# Patient Record
Sex: FEMALE | Race: WHITE | NOT HISPANIC OR LATINO | URBAN - METROPOLITAN AREA
[De-identification: names, ages, dates, MRNs, and addresses within clinical notes are randomized per-mention and may not be internally consistent; named-entity substitution may affect disease eponyms.]

---

## 2018-02-07 ENCOUNTER — OUTPATIENT (OUTPATIENT)
Dept: OUTPATIENT SERVICES | Facility: HOSPITAL | Age: 83
LOS: 1 days | Discharge: HOME | End: 2018-02-07

## 2018-02-07 DIAGNOSIS — I10 ESSENTIAL (PRIMARY) HYPERTENSION: ICD-10-CM

## 2018-02-07 DIAGNOSIS — N18.4 CHRONIC KIDNEY DISEASE, STAGE 4 (SEVERE): ICD-10-CM

## 2018-06-29 ENCOUNTER — OUTPATIENT (OUTPATIENT)
Dept: OUTPATIENT SERVICES | Facility: HOSPITAL | Age: 83
LOS: 1 days | Discharge: HOME | End: 2018-06-29

## 2018-06-29 DIAGNOSIS — N18.4 CHRONIC KIDNEY DISEASE, STAGE 4 (SEVERE): ICD-10-CM

## 2018-11-12 ENCOUNTER — INPATIENT (INPATIENT)
Facility: HOSPITAL | Age: 83
LOS: 5 days | Discharge: HOME HEALTH | End: 2018-11-18
Attending: INTERNAL MEDICINE | Admitting: INTERNAL MEDICINE

## 2018-11-12 VITALS
TEMPERATURE: 98 F | SYSTOLIC BLOOD PRESSURE: 101 MMHG | RESPIRATION RATE: 16 BRPM | DIASTOLIC BLOOD PRESSURE: 57 MMHG | HEART RATE: 77 BPM | OXYGEN SATURATION: 99 %

## 2018-11-12 DIAGNOSIS — H57.8 OTHER SPECIFIED DISORDERS OF EYE AND ADNEXA: Chronic | ICD-10-CM

## 2018-11-12 LAB
ABO RH CONFIRMATION: SIGNIFICANT CHANGE UP
ALBUMIN SERPL ELPH-MCNC: 3.6 G/DL — SIGNIFICANT CHANGE UP (ref 3.5–5.2)
ALP SERPL-CCNC: 73 U/L — SIGNIFICANT CHANGE UP (ref 30–115)
ALT FLD-CCNC: 28 U/L — SIGNIFICANT CHANGE UP (ref 0–41)
ANION GAP SERPL CALC-SCNC: 19 MMOL/L — HIGH (ref 7–14)
AST SERPL-CCNC: 28 U/L — SIGNIFICANT CHANGE UP (ref 0–41)
BASOPHILS # BLD AUTO: 0 K/UL — SIGNIFICANT CHANGE UP (ref 0–0.2)
BASOPHILS NFR BLD AUTO: 0 % — SIGNIFICANT CHANGE UP (ref 0–1)
BILIRUB SERPL-MCNC: 0.3 MG/DL — SIGNIFICANT CHANGE UP (ref 0.2–1.2)
BLD GP AB SCN SERPL QL: SIGNIFICANT CHANGE UP
BUN SERPL-MCNC: 75 MG/DL — CRITICAL HIGH (ref 10–20)
BURR CELLS BLD QL SMEAR: PRESENT — SIGNIFICANT CHANGE UP
BURR CELLS BLD QL SMEAR: SLIGHT — SIGNIFICANT CHANGE UP
CALCIUM SERPL-MCNC: 8.8 MG/DL — SIGNIFICANT CHANGE UP (ref 8.5–10.1)
CHLORIDE SERPL-SCNC: 99 MMOL/L — SIGNIFICANT CHANGE UP (ref 98–110)
CK SERPL-CCNC: 59 U/L — SIGNIFICANT CHANGE UP (ref 0–225)
CO2 SERPL-SCNC: 19 MMOL/L — SIGNIFICANT CHANGE UP (ref 17–32)
CREAT SERPL-MCNC: 2 MG/DL — HIGH (ref 0.7–1.5)
ELLIPTOCYTES BLD QL SMEAR: SLIGHT — SIGNIFICANT CHANGE UP
EOSINOPHIL # BLD AUTO: 0.21 K/UL — SIGNIFICANT CHANGE UP (ref 0–0.7)
EOSINOPHIL NFR BLD AUTO: 2.6 % — SIGNIFICANT CHANGE UP (ref 0–8)
GIANT PLATELETS BLD QL SMEAR: PRESENT — SIGNIFICANT CHANGE UP
GLUCOSE SERPL-MCNC: 151 MG/DL — HIGH (ref 70–99)
HCT VFR BLD CALC: 11.8 % — LOW (ref 37–47)
HGB BLD-MCNC: 3.7 G/DL — CRITICAL LOW (ref 12–16)
HYPOCHROMIA BLD QL: SIGNIFICANT CHANGE UP
LACTATE SERPL-SCNC: 2.3 MMOL/L — HIGH (ref 0.5–2.2)
LYMPHOCYTES # BLD AUTO: 1.44 K/UL — SIGNIFICANT CHANGE UP (ref 1.2–3.4)
LYMPHOCYTES # BLD AUTO: 17.6 % — LOW (ref 20.5–51.1)
MACROCYTES BLD QL: SIGNIFICANT CHANGE UP
MCHC RBC-ENTMCNC: 23.4 PG — LOW (ref 27–31)
MCHC RBC-ENTMCNC: 31.4 G/DL — LOW (ref 32–37)
MCV RBC AUTO: 74.7 FL — LOW (ref 81–99)
MICROCYTES BLD QL: SIGNIFICANT CHANGE UP
MONOCYTES # BLD AUTO: 0.93 K/UL — HIGH (ref 0.1–0.6)
MONOCYTES NFR BLD AUTO: 11.4 % — HIGH (ref 1.7–9.3)
NEUTROPHILS # BLD AUTO: 5.59 K/UL — SIGNIFICANT CHANGE UP (ref 1.4–6.5)
NEUTROPHILS NFR BLD AUTO: 68.4 % — SIGNIFICANT CHANGE UP (ref 42.2–75.2)
NRBC # BLD: 4 /100 — HIGH (ref 0–0)
NRBC # BLD: SIGNIFICANT CHANGE UP /100 WBCS (ref 0–0)
OVALOCYTES BLD QL SMEAR: SLIGHT — SIGNIFICANT CHANGE UP
PLAT MORPH BLD: NORMAL — SIGNIFICANT CHANGE UP
PLATELET # BLD AUTO: 307 K/UL — SIGNIFICANT CHANGE UP (ref 130–400)
POIKILOCYTOSIS BLD QL AUTO: SIGNIFICANT CHANGE UP
POLYCHROMASIA BLD QL SMEAR: SLIGHT — SIGNIFICANT CHANGE UP
POTASSIUM SERPL-MCNC: 4 MMOL/L — SIGNIFICANT CHANGE UP (ref 3.5–5)
POTASSIUM SERPL-SCNC: 4 MMOL/L — SIGNIFICANT CHANGE UP (ref 3.5–5)
PROT SERPL-MCNC: 6.2 G/DL — SIGNIFICANT CHANGE UP (ref 6–8)
RBC # BLD: 1.58 M/UL — LOW (ref 4.2–5.4)
RBC # FLD: 17.4 % — HIGH (ref 11.5–14.5)
RBC BLD AUTO: ABNORMAL
SODIUM SERPL-SCNC: 137 MMOL/L — SIGNIFICANT CHANGE UP (ref 135–146)
TROPONIN T SERPL-MCNC: 0.02 NG/ML — HIGH
TYPE + AB SCN PNL BLD: SIGNIFICANT CHANGE UP
WBC # BLD: 8.17 K/UL — SIGNIFICANT CHANGE UP (ref 4.8–10.8)
WBC # FLD AUTO: 8.17 K/UL — SIGNIFICANT CHANGE UP (ref 4.8–10.8)

## 2018-11-12 RX ORDER — DILTIAZEM HCL 120 MG
1 CAPSULE, EXT RELEASE 24 HR ORAL
Qty: 0 | Refills: 0 | COMMUNITY

## 2018-11-12 RX ORDER — TIMOLOL 0.5 %
1 DROPS OPHTHALMIC (EYE)
Qty: 0 | Refills: 0 | COMMUNITY

## 2018-11-12 RX ORDER — ACETAMINOPHEN 500 MG
650 TABLET ORAL ONCE
Qty: 0 | Refills: 0 | Status: COMPLETED | OUTPATIENT
Start: 2018-11-12 | End: 2018-11-12

## 2018-11-12 RX ORDER — TIMOLOL 0.5 %
1 DROPS OPHTHALMIC (EYE)
Qty: 0 | Refills: 0 | Status: DISCONTINUED | OUTPATIENT
Start: 2018-11-12 | End: 2018-11-15

## 2018-11-12 RX ORDER — PANTOPRAZOLE SODIUM 20 MG/1
80 TABLET, DELAYED RELEASE ORAL ONCE
Qty: 0 | Refills: 0 | Status: COMPLETED | OUTPATIENT
Start: 2018-11-12 | End: 2018-11-12

## 2018-11-12 RX ORDER — ACETAMINOPHEN 500 MG
650 TABLET ORAL EVERY 6 HOURS
Qty: 0 | Refills: 0 | Status: DISCONTINUED | OUTPATIENT
Start: 2018-11-12 | End: 2018-11-18

## 2018-11-12 RX ORDER — DILTIAZEM HCL 120 MG
120 CAPSULE, EXT RELEASE 24 HR ORAL DAILY
Qty: 0 | Refills: 0 | Status: DISCONTINUED | OUTPATIENT
Start: 2018-11-12 | End: 2018-11-18

## 2018-11-12 RX ADMIN — Medication 650 MILLIGRAM(S): at 20:15

## 2018-11-12 RX ADMIN — PANTOPRAZOLE SODIUM 80 MILLIGRAM(S): 20 TABLET, DELAYED RELEASE ORAL at 20:49

## 2018-11-12 NOTE — ED PROVIDER NOTE - ATTENDING CONTRIBUTION TO CARE
I personally evaluated the patient. I reviewed the Resident’s or Physician Assistant’s note (as assigned above), and agree with the findings and plan except as documented in my note.    89 y/o f pmh chf, afib on xeralto, glaucoma, htn, ckd presents with generalized weakness. Patient is also complaining of R posterior shoulder pain that improved slightly with warm compresses and massaging. Denies cp, sob, abd pain, back pain, vision change, headache, neck pain, numbness/tingling. No nausea, vomiting. No fevers, chills. No palpitations.     Plan: labs, shoulder xray, reassess

## 2018-11-12 NOTE — ED PROVIDER NOTE - MEDICAL DECISION MAKING DETAILS
89f w a-fib on rivaroxaban now w 1 wk generalized weakness also w R shoulder pain. Labs, EKG, & imaging reviewed. Hb 3.7. Rectal exam w dark stool. Protonix given. Transfusion started. Care d/w ICU fellow and not a candidate for ICU admit at this time. Patient admitted for further care and management.

## 2018-11-12 NOTE — ED PROVIDER NOTE - PHYSICAL EXAMINATION
CONSTITUTIONAL: Well-developed; well-nourished; in no acute distress.   SKIN: warm, dry  HEAD: Normocephalic; atraumatic.  EYES: normal sclera and conjunctiva   ENT: No nasal discharge; airway clear.  NECK: Supple; non tender.  CARD: S1, S2 normal; no murmurs, gallops, or rubs. Regular rate and rhythm.   RESP: No wheezes, rales or rhonchi.  ABD: soft ntnd  EXT: Normal ROM.  No clubbing, cyanosis or edema. No point tenderness over extremities. Pelvis stable, no tenderness over hips. No point tenderness or deformities over R shoulder, normal ROM of all 4 extremities.   LYMPH: No acute cervical adenopathy.  NEURO: Alert, oriented, grossly unremarkable. Moving all extremities well with no drift. No cranial nerve deficits, facial droop.   PSYCH: Cooperative, appropriate.

## 2018-11-12 NOTE — ED ADULT NURSE NOTE - NSIMPLEMENTINTERV_GEN_ALL_ED
Implemented All Fall with Harm Risk Interventions:  [place] to call system. Call bell, personal items and telephone within reach. Instruct patient to call for assistance. Room bathroom lighting operational. Non-slip footwear when patient is off stretcher. Physically safe environment: no spills, clutter or unnecessary equipment. Stretcher in lowest position, wheels locked, appropriate side rails in place. Provide visual cue, wrist band, yellow gown, etc. Monitor gait and stability. Monitor for mental status changes and reorient to person, place, and time. Review medications for side effects contributing to fall risk. Reinforce activity limits and safety measures with patient and family. Provide visual clues: red socks.

## 2018-11-12 NOTE — H&P ADULT - NSHPLABSRESULTS_GEN_ALL_CORE
3.7    8.17  )-----------( 307      ( 12 Nov 2018 16:15 )             11.8       11-12    137  |  99  |  75<HH>  ----------------------------<  151<H>  4.0   |  19  |  2.0<H>    Ca    8.8      12 Nov 2018 16:15    TPro  6.2  /  Alb  3.6  /  TBili  0.3  /  DBili  x   /  AST  28  /  ALT  28  /  AlkPhos  73  11-12

## 2018-11-12 NOTE — ED PROVIDER NOTE - PROGRESS NOTE DETAILS
Received from Dr Hernández. 89f w a-fib on rivaroxaban now w 1 wk generalized weakness also w R shoulder pain. Hb 3.7. Vitals stable. Labs, EKG, & imaging pending Rectal exam shows dark stool, no bright red blood. Spoke with ICU fellow Dr. Nova, not a candidate for ICU at this time. Spoke with Dr. Amilcar Gorman, knows patient well, said to admit to his service.

## 2018-11-12 NOTE — H&P ADULT - HISTORY OF PRESENT ILLNESS
89 yo F with PMHx of CKD, Afib on xarelto comes to ED for the generalized weakness. Pt states the pain is in the shoulder and and bilateral thigh. The pain is there for months but on admission the hemoglobin was 3.7. She denied any chest pain, shoulder pain and hip pain and follow  up with the card 89 yo F with PMHx of CKD, Afib on xarelto , glaucoma partially blind comes to ED for the generalized weakness. Pt states the pain is in the shoulder and and bilateral thigh. The pain is there for months but on admission the hemoglobin was 3.7. She denied any chest pain, palpitations, Nausea, vomiting or dark stools.    while speaking with the nephew she is very fine but she started feeling weak. It was gradual and started getting worse. She was seen by Dr Gorman on Saturday and he said to admit to the hospital but patient did not want to be admitted. But she was feeling very weak today.     In ED patient's hb was 3.7 adn she is receiving two units of blood. She stable on room air and does not feeling light headed or tachycardic. In ED patient had SAUD and it was guaiac positive stools.

## 2018-11-12 NOTE — H&P ADULT - NSHPPHYSICALEXAM_GEN_ALL_CORE
Constitutional: NAD Stable, hard on hearing     Neck: JVD present     Respiratory: Normal clear to auscultation     Cardiovascular: Irregular rate     Gastrointestinal: NT ND No HSM     Extremities: No edema     Neurological: AAO x 3

## 2018-11-12 NOTE — H&P ADULT - ASSESSMENT
89 yo F with PMHx Afib on xarelto, comes to ED for the weakness    # Microcytic anemia  - Hb of 3.7 on admission baseline is 8.5   - S/p 2 unit of transfusion   - GI evaluation  - Repeat Hb q12h     # Afib   - HR is controlled with cardizem  - Hold the xarelto as she is bleeding    # Rt shoulder pain and bilateral hip pain   - Likely secondary to Arthritis  - Tylenol for the pain control    # Glaucoma   - Continue home medications    # ESTRELLITA over CKD  - Baseline Cr was 1.5   - Repeat BMP    # Weakness and deconditioning   - PT and rehab consult     Dispo: Home 89 yo F with PMHx Afib on xarelto, comes to ED for the weakness    # Microcytic anemia  - Hb of 3.7 on admission baseline is 8.5   - S/p 2 unit of transfusion   - GI evaluation  - Repeat Hb q12h     # Afib   - HR is controlled with cardizem  - Hold the xarelto as she is bleeding    # Rt shoulder pain and bilateral hip pain   - Likely secondary to Arthritis  - Check the follow up xray  - Tylenol for the pain control    # Glaucoma   - Continue home medications    # ESTRELLITA over CKD  - Baseline Cr was 1.5   - Repeat BMP    # Weakness and deconditioning   - PT and rehab consult     Dispo: Home

## 2018-11-12 NOTE — ED PROVIDER NOTE - OBJECTIVE STATEMENT
89 y f pmh chf, afib, glaucoma, htn, ckd pw weakness. Generalized weakness and unable to walk 2/2 bilateral thigh pain 1 week ago. At baseline walks on her own without assistance, but for the past week has not been able to get herself up out of a chair. Also c/o R posterior shoulder pain that improved slightly with warm compresses and massaging. Denies cp, sob, abd pain, back pain, vision change, headache, neck pain, numbness/tingling.

## 2018-11-12 NOTE — ED PROVIDER NOTE - PMH
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

## 2018-11-13 LAB
ALBUMIN SERPL ELPH-MCNC: 3.3 G/DL — LOW (ref 3.5–5.2)
ALP SERPL-CCNC: 72 U/L — SIGNIFICANT CHANGE UP (ref 30–115)
ALT FLD-CCNC: 30 U/L — SIGNIFICANT CHANGE UP (ref 0–41)
ANION GAP SERPL CALC-SCNC: 18 MMOL/L — HIGH (ref 7–14)
APPEARANCE UR: ABNORMAL
AST SERPL-CCNC: 28 U/L — SIGNIFICANT CHANGE UP (ref 0–41)
BACTERIA # UR AUTO: ABNORMAL /HPF
BASOPHILS # BLD AUTO: 0.02 K/UL — SIGNIFICANT CHANGE UP (ref 0–0.2)
BASOPHILS # BLD AUTO: 0.02 K/UL — SIGNIFICANT CHANGE UP (ref 0–0.2)
BASOPHILS NFR BLD AUTO: 0.2 % — SIGNIFICANT CHANGE UP (ref 0–1)
BASOPHILS NFR BLD AUTO: 0.3 % — SIGNIFICANT CHANGE UP (ref 0–1)
BILIRUB SERPL-MCNC: 0.6 MG/DL — SIGNIFICANT CHANGE UP (ref 0.2–1.2)
BILIRUB UR-MCNC: NEGATIVE — SIGNIFICANT CHANGE UP
BUN SERPL-MCNC: 71 MG/DL — CRITICAL HIGH (ref 10–20)
CALCIUM SERPL-MCNC: 8.5 MG/DL — SIGNIFICANT CHANGE UP (ref 8.5–10.1)
CHLORIDE SERPL-SCNC: 100 MMOL/L — SIGNIFICANT CHANGE UP (ref 98–110)
CO2 SERPL-SCNC: 21 MMOL/L — SIGNIFICANT CHANGE UP (ref 17–32)
COLOR SPEC: YELLOW — SIGNIFICANT CHANGE UP
CREAT SERPL-MCNC: 1.8 MG/DL — HIGH (ref 0.7–1.5)
DIFF PNL FLD: NEGATIVE — SIGNIFICANT CHANGE UP
EOSINOPHIL # BLD AUTO: 0.31 K/UL — SIGNIFICANT CHANGE UP (ref 0–0.7)
EOSINOPHIL # BLD AUTO: 0.4 K/UL — SIGNIFICANT CHANGE UP (ref 0–0.7)
EOSINOPHIL NFR BLD AUTO: 4.8 % — SIGNIFICANT CHANGE UP (ref 0–8)
EOSINOPHIL NFR BLD AUTO: 4.8 % — SIGNIFICANT CHANGE UP (ref 0–8)
GLUCOSE SERPL-MCNC: 108 MG/DL — HIGH (ref 70–99)
GLUCOSE UR QL: NEGATIVE MG/DL — SIGNIFICANT CHANGE UP
HCT VFR BLD CALC: 20.2 % — LOW (ref 37–47)
HCT VFR BLD CALC: 26.3 % — LOW (ref 37–47)
HGB BLD-MCNC: 6.7 G/DL — CRITICAL LOW (ref 12–16)
HGB BLD-MCNC: 8.7 G/DL — LOW (ref 12–16)
IMM GRANULOCYTES NFR BLD AUTO: 0.6 % — HIGH (ref 0.1–0.3)
IMM GRANULOCYTES NFR BLD AUTO: 0.8 % — HIGH (ref 0.1–0.3)
KETONES UR-MCNC: NEGATIVE — SIGNIFICANT CHANGE UP
LEUKOCYTE ESTERASE UR-ACNC: NEGATIVE — SIGNIFICANT CHANGE UP
LYMPHOCYTES # BLD AUTO: 0.72 K/UL — LOW (ref 1.2–3.4)
LYMPHOCYTES # BLD AUTO: 0.8 K/UL — LOW (ref 1.2–3.4)
LYMPHOCYTES # BLD AUTO: 12.4 % — LOW (ref 20.5–51.1)
LYMPHOCYTES # BLD AUTO: 8.6 % — LOW (ref 20.5–51.1)
MAGNESIUM SERPL-MCNC: 2.8 MG/DL — HIGH (ref 1.8–2.4)
MCHC RBC-ENTMCNC: 26.3 PG — LOW (ref 27–31)
MCHC RBC-ENTMCNC: 26.3 PG — LOW (ref 27–31)
MCHC RBC-ENTMCNC: 33.1 G/DL — SIGNIFICANT CHANGE UP (ref 32–37)
MCHC RBC-ENTMCNC: 33.2 G/DL — SIGNIFICANT CHANGE UP (ref 32–37)
MCV RBC AUTO: 79.2 FL — LOW (ref 81–99)
MCV RBC AUTO: 79.5 FL — LOW (ref 81–99)
MONOCYTES # BLD AUTO: 0.94 K/UL — HIGH (ref 0.1–0.6)
MONOCYTES # BLD AUTO: 0.99 K/UL — HIGH (ref 0.1–0.6)
MONOCYTES NFR BLD AUTO: 11.9 % — HIGH (ref 1.7–9.3)
MONOCYTES NFR BLD AUTO: 14.5 % — HIGH (ref 1.7–9.3)
NEUTROPHILS # BLD AUTO: 4.36 K/UL — SIGNIFICANT CHANGE UP (ref 1.4–6.5)
NEUTROPHILS # BLD AUTO: 6.14 K/UL — SIGNIFICANT CHANGE UP (ref 1.4–6.5)
NEUTROPHILS NFR BLD AUTO: 67.4 % — SIGNIFICANT CHANGE UP (ref 42.2–75.2)
NEUTROPHILS NFR BLD AUTO: 73.7 % — SIGNIFICANT CHANGE UP (ref 42.2–75.2)
NITRITE UR-MCNC: NEGATIVE — SIGNIFICANT CHANGE UP
NRBC # BLD: 6 /100 WBCS — HIGH (ref 0–0)
PH UR: 6 — SIGNIFICANT CHANGE UP (ref 5–8)
PLATELET # BLD AUTO: 248 K/UL — SIGNIFICANT CHANGE UP (ref 130–400)
PLATELET # BLD AUTO: 285 K/UL — SIGNIFICANT CHANGE UP (ref 130–400)
POTASSIUM SERPL-MCNC: 4.2 MMOL/L — SIGNIFICANT CHANGE UP (ref 3.5–5)
POTASSIUM SERPL-SCNC: 4.2 MMOL/L — SIGNIFICANT CHANGE UP (ref 3.5–5)
PROT SERPL-MCNC: 5.7 G/DL — LOW (ref 6–8)
PROT UR-MCNC: NEGATIVE MG/DL — SIGNIFICANT CHANGE UP
RBC # BLD: 2.55 M/UL — LOW (ref 4.2–5.4)
RBC # BLD: 3.31 M/UL — LOW (ref 4.2–5.4)
RBC # FLD: 16.1 % — HIGH (ref 11.5–14.5)
RBC # FLD: 17.2 % — HIGH (ref 11.5–14.5)
SODIUM SERPL-SCNC: 139 MMOL/L — SIGNIFICANT CHANGE UP (ref 135–146)
SP GR SPEC: 1.02 — SIGNIFICANT CHANGE UP (ref 1.01–1.03)
UROBILINOGEN FLD QL: 0.2 MG/DL — SIGNIFICANT CHANGE UP (ref 0.2–0.2)
WBC # BLD: 6.47 K/UL — SIGNIFICANT CHANGE UP (ref 4.8–10.8)
WBC # BLD: 8.34 K/UL — SIGNIFICANT CHANGE UP (ref 4.8–10.8)
WBC # FLD AUTO: 6.47 K/UL — SIGNIFICANT CHANGE UP (ref 4.8–10.8)
WBC # FLD AUTO: 8.34 K/UL — SIGNIFICANT CHANGE UP (ref 4.8–10.8)

## 2018-11-13 RX ORDER — PANTOPRAZOLE SODIUM 20 MG/1
40 TABLET, DELAYED RELEASE ORAL
Qty: 0 | Refills: 0 | Status: DISCONTINUED | OUTPATIENT
Start: 2018-11-13 | End: 2018-11-17

## 2018-11-13 RX ADMIN — Medication 650 MILLIGRAM(S): at 19:38

## 2018-11-13 RX ADMIN — Medication 650 MILLIGRAM(S): at 18:39

## 2018-11-13 RX ADMIN — Medication 1 DROP(S): at 10:12

## 2018-11-13 RX ADMIN — Medication 120 MILLIGRAM(S): at 10:11

## 2018-11-13 RX ADMIN — PANTOPRAZOLE SODIUM 40 MILLIGRAM(S): 20 TABLET, DELAYED RELEASE ORAL at 21:36

## 2018-11-13 NOTE — CONSULT NOTE ADULT - ATTENDING COMMENTS
Pt seen and examined with GI team.  Pt is a 89 yo F on Xarelto (last dose 11/12 morning) for AF admitted with melena.  She is normotensive and non tachycardic and was found initially to have a Hb of 3.7 for which she received a transfusion. Now her Hb is 6.7.  I would recommend transfusion to >8 slowly.  She has a PLT of 248K and there was no coagulation panel available, please order a coag panel.  She denies abdominal pain, nausea or vomiting.  She has never had an egd or colonoscopy.  Right now she is stable and emergent egd is not needed, which will give us time to have the Xarelto eliminate itself from her system over the next four days given her renal insufficiency but if more emergent bleeding occurs a reversal agent will need to be given (Andexxa) which is available here and an emergent egd will be done.  I would recommend social work consultation given her near blindness to give advise regarding her present living arrangements.  Some of the history was obtained from the patient's niece at the bedside today, and the patient herself.  The earliest egd can be done without reversal would be Friday (11/16).  The most likely source of her melena seems UGI therefore I would recommend a pantoprazole drip.

## 2018-11-13 NOTE — CONSULT NOTE ADULT - ASSESSMENT
IMPRESSION:  Acute on chronic anemia secondary to suspected upper GI bleed   Mild ESTRELLITA on CKD  HO Afib on xarelto    PLAN:    CNS: No excessive sedation    HEENT: Oral care    PULMONARY: HOB at 45 degrees; Monitor for signs of fluid overload;     CARDIOVASCULAR: Keep I=<O; Rate control; CE x2; Hold xarelto for now    GI: Protonix drip; NPO for now; GI eval;     RENAL: FU lytes and correct as needed;     INFECTIOUS DISEASE: Panculture    HEMATOLOGICAL:  DVT prophylaxis--> SCD; FU H/H and coags; Target Hb >7     ENDOCRINE:  Follow up FS.  Insulin protocol if needed.    Patient hemodynamically stable with no active bleeding overnight.  Will not benefit from MICU monitoring IMPRESSION:  Acute on chronic anemia secondary to suspected upper GI bleed   Mild ESTRELLITA on CKD  HO Afib on xarelto    PLAN:    CNS: No excessive sedation    HEENT: Oral care    PULMONARY: HOB at 45 degrees; Monitor for signs of fluid overload;     CARDIOVASCULAR: Keep I=<O; Rate control; CE x2; Hold xarelto for now    GI: Protonix drip; NPO for now; GI eval;     RENAL: FU lytes and correct as needed;     INFECTIOUS DISEASE: Panculture    HEMATOLOGICAL:  DVT prophylaxis--> SCD; FU H/H and coags; Target Hb >7     ENDOCRINE:  Follow up FS.     Patient hemodynamically stable with no active bleeding overnight.  Will not benefit from MICU monitoring at this time

## 2018-11-13 NOTE — CONSULT NOTE ADULT - SUBJECTIVE AND OBJECTIVE BOX
GI HPI:  Patient is a 89y old  Female who presents with a chief complaint of Generalized weakness (12 Nov 2018 21:15)  . Patient complaining of       Hospital course:  89 yo F with PMHx of CKD, Afib on xarelto , glaucoma partially blind comes to ED for the generalized weakness. Pt states the pain is in the shoulder and and bilateral thigh. The pain is there for months but on admission the hemoglobin was 3.7. She denied any chest pain, palpitations, Nausea, vomiting or dark stools.    while speaking with the nephew she is very fine but she started feeling weak. It was gradual and started getting worse. She was seen by Dr Gorman on Saturday and he said to admit to the hospital but patient did not want to be admitted. But she was feeling very weak today.     In ED patient's hb was 3.7 adn she is receiving two units of blood. She stable on room air and does not feeling light headed or tachycardic. In ED patient had SAUD and it was guaiac positive stools. 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 650 milliGRAM(s) Oral every 6 hours PRN Severe Pain (7 - 10)      HOME MEDICATIONS:  Home Medications:  dilTIAZem 120 mg/24 hours oral tablet, extended release: 1 tab(s) orally once a day (12 Nov 2018 22:43)  timolol hemihydrate 0.5% ophthalmic solution: 1 drop(s) to each affected eye 2 times a day (12 Nov 2018 22:42)  Xarelto 15 mg oral tablet: 1 tab(s) orally once a day (in the evening) (12 Nov 2018 22:42)      ROS:     REVIEW OF SYSTEMS  General:  No fevers  Eyes:  No reported pain   ENT:  No sore throat   NECK: No stiffness   CV:  No chest pain   Resp:  No shortness of breath  GI:  See HPI  :  No dysuria  Muscle:  No aches or weakness  Neuro:  No tingling  Endocrine:  No polyuria  Heme:  No ecchymosis   All other review of systems is negative unless indicated above.         VITALS:   T(F): 97.5 (11-13 @ 07:20), Max: 98.1 (11-12 @ 15:41)  HR: 78 (11-13 @ 07:20) (77 - 93)  BP: 116/56 (11-13 @ 07:20) (85/42 - 116/56)  BP(mean): --  RR: 17 (11-13 @ 07:20) (16 - 19)  SpO2: 98% (11-13 @ 07:20) (98% - 99%)    I&O's Summary      PHYSICAL EXAM:  EYES: No scleral icterus   LUNG: Clear to auscultation bilaterally; No rales, rhonchi, wheezing, or rubs  HEART: RRR; No murmurs  ABDOMEN: Soft, +BS, Abdominal Tenderness, No guarding, No Canales Sign   Rectal Exam:     LABS:                        3.7    8.17  )-----------( 307      ( 12 Nov 2018 16:15 )             11.8       LIVER FUNCTIONS - ( 12 Nov 2018 16:15 )  Alb: 3.6 g/dL / Pro: 6.2 g/dL / ALK PHOS: 73 U/L / ALT: 28 U/L / AST: 28 U/L / GGT: x           11-12    137  |  99  |  75<HH>  ----------------------------<  151<H>  4.0   |  19  |  2.0<H>    Ca    8.8      12 Nov 2018 16:15      CARDIAC MARKERS ( 12 Nov 2018 16:15 )  x     / 0.02 ng/mL / 59 U/L / x     / x              Previous EGD:    Previous colonoscopy:       RADIOLOGY: GI HPI:  Patient is a 89y old  Female who presents with a chief complaint of Generalized weakness (12 Nov 2018 21:15)  89 yo F with PMHx of CKD, Afib on xarelto last dose 11/12/2018 AM , glaucoma partially blind comes to ED for the generalized weakness. On admission the hemoglobin was 3.7. She denied any chest pain, palpitations, Nausea, vomiting or dark stools.  while speaking with the nephew she is very fine but she started feeling weak. It was gradual and started getting worse. She was seen by Dr Gorman on Saturday and he said to admit to the hospital but patient did not want to be admitted. But she was feeling very weak today.     In ED patient's hb was 3.7 adn she is receiving two units of blood. She stable on room air and does not feeling light headed or tachycardic. In ED patient had SAUD and it was melanotic . 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 650 milliGRAM(s) Oral every 6 hours PRN Severe Pain (7 - 10)      HOME MEDICATIONS:  Home Medications:  dilTIAZem 120 mg/24 hours oral tablet, extended release: 1 tab(s) orally once a day (12 Nov 2018 22:43)  timolol hemihydrate 0.5% ophthalmic solution: 1 drop(s) to each affected eye 2 times a day (12 Nov 2018 22:42)  Xarelto 15 mg oral tablet: 1 tab(s) orally once a day (in the evening) (12 Nov 2018 22:42)      ROS:     REVIEW OF SYSTEMS  General: c/o generalized weakness   Eyes:  No reported pain   ENT:  No sore throat   NECK: No stiffness   CV:  No chest pain   Resp:  No shortness of breath  GI:  See HPI  :  No dysuria  Muscle:  No aches or weakness  Neuro:  No tingling  Endocrine:  No polyuria  Heme:  No ecchymosis   All other review of systems is negative unless indicated above.         VITALS:   T(F): 97.5 (11-13 @ 07:20), Max: 98.1 (11-12 @ 15:41)  HR: 78 (11-13 @ 07:20) (77 - 93)  BP: 116/56 (11-13 @ 07:20) (85/42 - 116/56)  BP(mean): --  RR: 17 (11-13 @ 07:20) (16 - 19)  SpO2: 98% (11-13 @ 07:20) (98% - 99%)    I&O's Summary      PHYSICAL EXAM:  EYES: No scleral icterus   LUNG: Clear to auscultation bilaterally; No rales, rhonchi, wheezing, or rubs  HEART: RRR; No murmurs  ABDOMEN: Soft, +BS, no abd distension, no Abdominal Tenderness, No guarding, No Canales Sign   Rectal Exam:     LABS:                        3.7    8.17  )-----------( 307      ( 12 Nov 2018 16:15 )             11.8       LIVER FUNCTIONS - ( 12 Nov 2018 16:15 )  Alb: 3.6 g/dL / Pro: 6.2 g/dL / ALK PHOS: 73 U/L / ALT: 28 U/L / AST: 28 U/L / GGT: x           11-12    137  |  99  |  75<HH>  ----------------------------<  151<H>  4.0   |  19  |  2.0<H>    Ca    8.8      12 Nov 2018 16:15      CARDIAC MARKERS ( 12 Nov 2018 16:15 )  x     / 0.02 ng/mL / 59 U/L / x     / x              Previous EGD: none     Previous colonoscopy: none       RADIOLOGY:

## 2018-11-13 NOTE — CONSULT NOTE ADULT - SUBJECTIVE AND OBJECTIVE BOX
HPI:  89 yo F with PMHx of CKD, Afib on xarelto , glaucoma partially blind comes to ED for the generalized weakness. Pt states the pain is in the shoulder and and bilateral thigh. The pain is there for months but on admission the hemoglobin was 3.7. She denied any chest pain, palpitations, Nausea, vomiting or dark stools.    while speaking with the nephew she is very fine but she started feeling weak. It was gradual and started getting worse. She was seen by Dr Gorman on Saturday and he said to admit to the hospital but patient did not want to be admitted. But she was feeling very weak today.     In ED patient's hb was 3.7 adn she is receiving two units of blood. She stable on room air and does not feeling light headed or tachycardic. In ED patient had SAUD and it was guaiac positive stools. 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 Normally she walks nicely without an assistive device. she has become weak over the last week. She is getting another unit of blood today after having gotten 2 already. her hemogobin was 3.7. She knows not to take NSAID's on anticoagulation.  TODAY'S SUBJECTIVE & REVIEW OF SYMPTOMS:     Constitutional WNL   Cardio WNL   Resp WNL   GI WNL  Heme WNL  Endo WNL  Skin WNL  MSK WNL  Neuro WNL  Cognitive WNL  Psych WNL      MEDICATIONS  (STANDING):  diltiazem    milliGRAM(s) Oral daily  timolol 0.5% Solution 1 Drop(s) Both EYES two times a day    MEDICATIONS  (PRN):  acetaminophen   Tablet .. 650 milliGRAM(s) Oral every 6 hours PRN Severe Pain (7 - 10)      FAMILY HISTORY:  No pertinent family history in first degree relatives      Allergies    No Known Allergies    Intolerances        SOCIAL HISTORY:    [  ] Etoh  [  ] Smoking  [  ] Substance abuse     Home Environment:  [  ] Home Alone  [  ] Lives with Family  [  ] Home Health Aid    Dwelling:  [  ] Apartment  [  ] Private House  [  ] Adult Home  [  ] Skilled Nursing Facility      [  ] Short Term  [  ] Long Term  [  ] Stairs       Elevator [  ]    FUNCTIONAL STATUS PTA: (Check all that apply)  Ambulation: [   ]Independent    [  ] Dependent     [  ] Non-Ambulatory  Assistive Device: [  ] SA Cane  [  ]  Q Cane  [  ] Walker  [  ]  Wheelchair  ADL : [  ] Independent  [  ]  Dependent       Vital Signs Last 24 Hrs  T(C): 36.4 (2018 07:20), Max: 36.7 (2018 15:41)  T(F): 97.5 (2018 07:20), Max: 98.1 (2018 15:41)  HR: 78 (2018 07:20) (77 - 93)  BP: 116/56 (2018 07:20) (85/42 - 116/56)  BP(mean): --  RR: 17 (2018 07:20) (16 - 19)  SpO2: 98% (2018 07:20) (98% - 99%)      PHYSICAL EXAM: Alert & Oriented X3  GENERAL: NAD, well-groomed, well-developed  HEAD:  Atraumatic, Normocephalic  EYES: EOMI, PERRLA, conjunctiva and sclera clear  NECK: Supple, No JVD, Normal thyroid  CHEST/LUNG: Clear to percussion bilaterally; No rales, rhonchi, wheezing, or rubs  HEART: Regular rate and rhythm; No murmurs, rubs, or gallops  ABDOMEN: Soft, Nontender, Nondistended; Bowel sounds present  EXTREMITIES:  2+ Peripheral Pulses, No clubbing, cyanosis, or edema    NERVOUS SYSTEM:  Cranial Nerves 2-12 intact [  ] Abnormal  [  ]  ROM: WFL all extremities [  ]  Abnormal [  ] No pain with log rolling of hips; left shoulder decreased foward flexion to 130 degrees passively.  Motor Strength: WFL all extremities  [  ]  Abnormal [ x ]5-/5  Sensation: intact to light touch [  ] Abnormal [  ]  Reflexes: Symmetric [  ]  Abnormal [  ]    FUNCTIONAL STATUS:  Bed Mobility: Independent [  ]  Supervision [  ]  Needs Assistance [  ]  N/A [  ]  Transfers: Independent [  ]  Supervision [  ]  Needs Assistance [  ]  N/A [  ]   Ambulation: Independent [  ]  Supervision [  ]  Needs Assistance [  ]  N/A [  ]  ADL: Independent [  ] Requires Assistance [  ] N/A [  ]      LABS:                        6.7    6.47  )-----------( 248      ( 2018 07:54 )             20.2     11-    139  |  100  |  71<HH>  ----------------------------<  108<H>  4.2   |  21  |  1.8<H>    Ca    8.5      2018 07:54  Mg     2.8         TPro  5.7<L>  /  Alb  3.3<L>  /  TBili  0.6  /  DBili  x   /  AST  28  /  ALT  30  /  AlkPhos  72  11-      Urinalysis Basic - ( 2018 07:46 )    Color: Yellow / Appearance: Cloudy / S.020 / pH: x  Gluc: x / Ketone: Negative  / Bili: Negative / Urobili: 0.2 mg/dL   Blood: x / Protein: Negative mg/dL / Nitrite: Negative   Leuk Esterase: Negative / RBC: x / WBC x   Sq Epi: x / Non Sq Epi: x / Bacteria: Moderate /HPF        RADIOLOGY & ADDITIONAL STUDIES:    Assesment:

## 2018-11-13 NOTE — CONSULT NOTE ADULT - ASSESSMENT
89 yo F with PMHx of CKD, Afib on xarelto last dose 11/12/2018 AM , glaucoma partially blind comes to ED for the generalized weakness. On admission the hemoglobin was 3.7. In ed found to have melena on rectal exam.     Melena - Upper GI bleed   Vital signs - stable   Assc with Acute on chronic microcytic anemia - Hb was 9.9 in feb 2018 and then 8.5 in june 2.18 currently at 3.7   rec transfuse 3 unit prbc   repeat CBC stat  rec   NPO   IV protonix drip  2 18 G IV access  Active type and screen  cbc q8, transfuse to keep HB >8  hold xarelto for now   Will need EGD once Hb is around 8 and xarelto held for total of 4 days duration  In the meanwhile if patient develops active GI bleed with unstable vitals , down trending hb will plan for emergent egd.  Attending rec to follow

## 2018-11-13 NOTE — CONSULT NOTE ADULT - ASSESSMENT
IMPRESSION: Rehab of  89 yo F with debility secondary to anemia, OA, lives alone    PRECAUTIONS: [  ] Cardiac  [  ] Respiratory  [  ] Seizures [  ] Contact Isolation  [  ] Droplet Isolation  [  ] Other    Weight Bearing Status:     RECOMMENDATION:    Out of Bed to Chair     DVT/Decubiti Prophylaxis    REHAB PLAN:     [ xx  ] Bedside P/T 3-5 times a week   [   ]   Bedside O/T  2-3 times a week             [   ] No Rehab Therapy Indicated                   [   ]  Speech Therapy   Conditioning/ROM                                    ADL  Bed Mobility                                               Conditioning/ROM  Transfers                                                     Bed Mobility  Sitting /Standing Balance                         Transfers                                        Gait Training                                               Sitting/Standing Balance  Stair Training [   ]Applicable                    Home equipment Eval                                                                        Splinting  [   ] Only      GOALS:   ADL   [x   ]   Independent                    Transfers  [x   ] Independent                          Ambulation  [ x  ] Independent     [  x  ] With device                            [ x  ]  CG                                                         [   ]  CG                                                                  [   ] CG                            [    ] Min A                                                   [   ] Min A                                                              [   ] Min  A          DISCHARGE PLAN:   [   ]  Good candidate for Intensive Rehabilitation/Hospital based-[facility]                                             Will tolerate 3hrs Intensive Rehab Daily                                       [  xx  ]  Short Term Rehab in Skilled Nursing Facility                                       [    ]  Home with Outpatient or VN services                                         [    ]  Possible Candidate for Intensive Hospital based Rehab

## 2018-11-13 NOTE — CONSULT NOTE ADULT - SUBJECTIVE AND OBJECTIVE BOX
Patient is a 89y old  Female who presents with a chief complaint of Generalized weakness (2018 07:41)      HPI:  89 yo F with PMHx of CKD, Afib on xarelto , glaucoma partially blind comes to ED for the generalized weakness. Pt states the pain is in the shoulder and and bilateral thigh. The pain is there for months but on admission the hemoglobin was 3.7. She denied any chest pain, palpitations, Nausea, vomiting or dark stools.    while speaking with the nephew she is very fine but she started feeling weak. It was gradual and started getting worse. She was seen by Dr Gorman on Saturday and he said to admit to the hospital but patient did not want to be admitted. But she was feeling very weak today.     In ED patient's hb was 3.7 adn she is receiving two units of blood. She stable on room air and does not feeling light headed or tachycardic. In ED patient had SAUD and it was guaiac positive stools. (2018 21:15)    ICU eval requested for anemia and suspected GI bleed.     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, severe stage  Hypertension, unspecified type  Other congestive heart failure  Eye drainage      SOCIAL HX:  Neg x3    FAMILY HISTORY:  No pertinent family history in first degree relatives    Review of system:  See HPI    Allergies    No Known Allergies    PHYSICAL EXAM    ICU Vital Signs Last 24 Hrs  T(C): 36.4 (2018 07:20), Max: 36.7 (2018 15:41)  T(F): 97.5 (2018 07:20), Max: 98.1 (2018 15:41)  HR: 78 (2018 07:20) (77 - 93)  BP: 116/56 (2018 07:20) (85/42 - 116/56)  RR: 17 (2018 07:20) (16 - 19)  SpO2: 98% (2018 07:20) (98% - 99%)    I&O's Detail    General: Comfortable in bed  HEENT:  On NC  Lymph node: No palpable LN             Lungs: SARAH over bases  Cardiovascular: irregular, S1S2  Abdomen: BS+ve; soft non tender  Extremities: No LE edema  Neurological:  No focal deficit      LABS:                          3.7    8.17  )-----------( 307      ( 2018 16:15 )             11.8   12    137  |  99  |  75<HH>  ----------------------------<  151<H>  4.0   |  19  |  2.0<H>    Ca    8.8      2018 16:15    TPro  6.2  /  Alb  3.6  /  TBili  0.3  /  DBili  x   /  AST  28  /  ALT  28  /  AlkPhos  73      Urinalysis Basic - ( 2018 07:46 )  Color: Yellow / Appearance: Cloudy / S.020 / pH: x  Gluc: x / Ketone: Negative  / Bili: Negative / Urobili: 0.2 mg/dL   Blood: x / Protein: Negative mg/dL / Nitrite: Negative   Leuk Esterase: Negative / RBC: x / WBC x   Sq Epi: x / Non Sq Epi: x / Bacteria: Moderate /HPF    CARDIAC MARKERS ( 2018 16:15 )  x     / 0.02 ng/mL / 59 U/L / x     / x        LIVER FUNCTIONS - ( 2018 16:15 )  Alb: 3.6 g/dL / Pro: 6.2 g/dL / ALK PHOS: 73 U/L / ALT: 28 U/L / AST: 28 U/L / GGT: x                                              Lactate (18 @ 16:15): 2.3<H>    MEDICATIONS  (STANDING):  diltiazem    milliGRAM(s) Oral daily  timolol 0.5% Solution 1 Drop(s) Both EYES two times a day    MEDICATIONS  (PRN):  acetaminophen   Tablet .. 650 milliGRAM(s) Oral every 6 hours PRN Severe Pain (7 - 10)    Radiology:  < from: Xray Chest 1 View AP/PA (18 @ 19:06) >  Impression:      Pulmonary vascular congestion with small pleural effusions.    < end of copied text > Patient is a 89y old  Female who presents with a chief complaint of Generalized weakness (2018 07:41)      HPI:  89 yo F with PMHx of CKD, Afib on xarelto , glaucoma partially blind comes to ED for the generalized weakness. Pt states the pain is in the shoulder and and bilateral thigh. The pain is there for months but on admission the hemoglobin was 3.7. She denied any chest pain, palpitations, Nausea, vomiting or dark stools.    while speaking with the nephew she is very fine but she started feeling weak. It was gradual and started getting worse. She was seen by Dr Gorman on Saturday and he said to admit to the hospital but patient did not want to be admitted. But she was feeling very weak today.     In ED patient's hb was 3.7 adn she is receiving two units of blood. She stable on room air and does not feeling light headed or tachycardic. In ED patient had SAUD and it was guaiac positive stools. (2018 21:15)    ICU eval requested for anemia and suspected GI bleed. s/p 2 PRBC overnight    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, severe stage  Hypertension, unspecified type  Other congestive heart failure  Eye drainage      SOCIAL HX:  Neg x3    FAMILY HISTORY:  No pertinent family history in first degree relatives    Review of system:  See HPI    Allergies    No Known Allergies    PHYSICAL EXAM    ICU Vital Signs Last 24 Hrs  T(C): 36.4 (2018 07:20), Max: 36.7 (2018 15:41)  T(F): 97.5 (2018 07:20), Max: 98.1 (2018 15:41)  HR: 78 (2018 07:20) (77 - 93)  BP: 116/56 (2018 07:20) (85/42 - 116/56)  RR: 17 (2018 07:20) (16 - 19)  SpO2: 98% (2018 07:20) (98% - 99%)    I&O's Detail    General: Comfortable in bed; lethargic but arousable  HEENT:  On NC  Lymph node: No palpable LN             Lungs: SARAH over bases  Cardiovascular: regular, S1S2  Abdomen: BS+ve; soft non tender  Extremities: No LE edema  Neurological:  No focal deficit; Responds to simple questions       LABS:                          3.7    8.17  )-----------( 307      ( 2018 16:15 )             11.8       137  |  99  |  75<HH>  ----------------------------<  151<H>  4.0   |  19  |  2.0<H>    Ca    8.8      2018 16:15    TPro  6.2  /  Alb  3.6  /  TBili  0.3  /  DBili  x   /  AST  28  /  ALT  28  /  AlkPhos  73      Urinalysis Basic - ( 2018 07:46 )  Color: Yellow / Appearance: Cloudy / S.020 / pH: x  Gluc: x / Ketone: Negative  / Bili: Negative / Urobili: 0.2 mg/dL   Blood: x / Protein: Negative mg/dL / Nitrite: Negative   Leuk Esterase: Negative / RBC: x / WBC x   Sq Epi: x / Non Sq Epi: x / Bacteria: Moderate /HPF    CARDIAC MARKERS ( 2018 16:15 )  x     / 0.02 ng/mL / 59 U/L / x     / x        LIVER FUNCTIONS - ( 2018 16:15 )  Alb: 3.6 g/dL / Pro: 6.2 g/dL / ALK PHOS: 73 U/L / ALT: 28 U/L / AST: 28 U/L / GGT: x                                              Lactate (18 @ 16:15): 2.3<H>    MEDICATIONS  (STANDING):  diltiazem    milliGRAM(s) Oral daily  timolol 0.5% Solution 1 Drop(s) Both EYES two times a day    MEDICATIONS  (PRN):  acetaminophen   Tablet .. 650 milliGRAM(s) Oral every 6 hours PRN Severe Pain (7 - 10)    Radiology:  < from: Xray Chest 1 View AP/PA (18 @ 19:06) >  Impression:      Pulmonary vascular congestion with small pleural effusions.    < end of copied text >

## 2018-11-13 NOTE — PROGRESS NOTE ADULT - SUBJECTIVE AND OBJECTIVE BOX
SUBJECTIVE:    Patient is a 89y old Female who presents with a chief complaint of Generalized weakness (2018 08:46)    Currently admitted to medicine with the primary diagnosis of Anemia     Today is hospital day 1d. This morning she is resting comfortably in bed and reports no new issues or overnight events.     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     ALLERGIES:  No Known Allergies    MEDICATIONS:  STANDING MEDICATIONS  diltiazem    milliGRAM(s) Oral daily  timolol 0.5% Solution 1 Drop(s) Both EYES two times a day    PRN MEDICATIONS  acetaminophen   Tablet .. 650 milliGRAM(s) Oral every 6 hours PRN    VITALS:   T(F): 97.5  HR: 78  BP: 116/56  RR: 17  SpO2: 98%    LABS:                        6.7    6.47  )-----------( 248      ( 2018 07:54 )             20.2         139  |  100  |  71<HH>  ----------------------------<  108<H>  4.2   |  21  |  1.8<H>    Ca    8.5      2018 07:54  Mg     2.8         TPro  5.7<L>  /  Alb  3.3<L>  /  TBili  0.6  /  DBili  x   /  AST  28  /  ALT  30  /  AlkPhos  72        Urinalysis Basic - ( 2018 07:46 )    Color: Yellow / Appearance: Cloudy / S.020 / pH: x  Gluc: x / Ketone: Negative  / Bili: Negative / Urobili: 0.2 mg/dL   Blood: x / Protein: Negative mg/dL / Nitrite: Negative   Leuk Esterase: Negative / RBC: x / WBC x   Sq Epi: x / Non Sq Epi: x / Bacteria: Moderate /HPF        Creatine Kinase, Serum: 59 U/L (18 @ 16:15)  Troponin T, Serum: 0.02 ng/mL <H> (18 @ 16:15)  Lactate, Blood: 2.3 mmol/L <H> (18 @ 16:15)      CARDIAC MARKERS ( 2018 16:15 )  x     / 0.02 ng/mL / 59 U/L / x     / x          PHYSICAL EXAM:  GEN: No acute distress  LUNGS: Clear to auscultation bilaterally   HEART: S1/S2 present. RRR.   ABD: Soft, non-tender, non-distended. Bowel sounds present  EXT: right shoulder pain and bilateral thigh pain

## 2018-11-13 NOTE — PROGRESS NOTE ADULT - ASSESSMENT
89 yo F with PMHx Afib on xarelto, comes to ED for the weakness    # Microcytic anemia, Hb of 3.7 on admission baseline is 8.5   - S/p 2 unit of transfusion, will transfuse 1 more as patient is now 6.7  - GI evaluation  - Repeat Hb q12h     # Afib   - HR is controlled with cardizem  - Hold the xarelto as she is bleeding    # Rt shoulder pain and bilateral hip pain   - Likely secondary to Arthritis  - XR shows no fracture  - Tylenol for the pain control    # Glaucoma   - Continue home medications    # ESTRELLITA over CKD  - Baseline Cr was 1.5   - will follow bmp and treat accordingly, this mornings bmp shows creatinine of 1.8, resolving    # Weakness and deconditioning   - PT and rehab consult placed    Diet: Full Liquid  Activity: Ambulate as tolerated  DVT ppx: was on xarelto, now held  GI ppx: protonix   Dispo: Home   Full Code, will discuss with family to consider options pertaining to end of life care as risk vs benefit should be considered.

## 2018-11-14 LAB
ANION GAP SERPL CALC-SCNC: 13 MMOL/L — SIGNIFICANT CHANGE UP (ref 7–14)
BASOPHILS # BLD AUTO: 0.03 K/UL — SIGNIFICANT CHANGE UP (ref 0–0.2)
BASOPHILS NFR BLD AUTO: 0.3 % — SIGNIFICANT CHANGE UP (ref 0–1)
BUN SERPL-MCNC: 53 MG/DL — HIGH (ref 10–20)
CALCIUM SERPL-MCNC: 8.8 MG/DL — SIGNIFICANT CHANGE UP (ref 8.5–10.1)
CHLORIDE SERPL-SCNC: 103 MMOL/L — SIGNIFICANT CHANGE UP (ref 98–110)
CO2 SERPL-SCNC: 22 MMOL/L — SIGNIFICANT CHANGE UP (ref 17–32)
CREAT SERPL-MCNC: 1.6 MG/DL — HIGH (ref 0.7–1.5)
EOSINOPHIL # BLD AUTO: 0.36 K/UL — SIGNIFICANT CHANGE UP (ref 0–0.7)
EOSINOPHIL NFR BLD AUTO: 4.2 % — SIGNIFICANT CHANGE UP (ref 0–8)
GLUCOSE SERPL-MCNC: 101 MG/DL — HIGH (ref 70–99)
HCT VFR BLD CALC: 25.8 % — LOW (ref 37–47)
HGB BLD-MCNC: 8.7 G/DL — LOW (ref 12–16)
IMM GRANULOCYTES NFR BLD AUTO: 0.6 % — HIGH (ref 0.1–0.3)
LYMPHOCYTES # BLD AUTO: 0.74 K/UL — LOW (ref 1.2–3.4)
LYMPHOCYTES # BLD AUTO: 8.6 % — LOW (ref 20.5–51.1)
MAGNESIUM SERPL-MCNC: 2.8 MG/DL — HIGH (ref 1.8–2.4)
MCHC RBC-ENTMCNC: 26.9 PG — LOW (ref 27–31)
MCHC RBC-ENTMCNC: 33.7 G/DL — SIGNIFICANT CHANGE UP (ref 32–37)
MCV RBC AUTO: 79.6 FL — LOW (ref 81–99)
MONOCYTES # BLD AUTO: 1.11 K/UL — HIGH (ref 0.1–0.6)
MONOCYTES NFR BLD AUTO: 12.9 % — HIGH (ref 1.7–9.3)
NEUTROPHILS # BLD AUTO: 6.34 K/UL — SIGNIFICANT CHANGE UP (ref 1.4–6.5)
NEUTROPHILS NFR BLD AUTO: 73.4 % — SIGNIFICANT CHANGE UP (ref 42.2–75.2)
NRBC # BLD: 3 /100 WBCS — HIGH (ref 0–0)
PHOSPHATE SERPL-MCNC: 3.4 MG/DL — SIGNIFICANT CHANGE UP (ref 2.1–4.9)
PLATELET # BLD AUTO: 262 K/UL — SIGNIFICANT CHANGE UP (ref 130–400)
POTASSIUM SERPL-MCNC: 4.7 MMOL/L — SIGNIFICANT CHANGE UP (ref 3.5–5)
POTASSIUM SERPL-SCNC: 4.7 MMOL/L — SIGNIFICANT CHANGE UP (ref 3.5–5)
RBC # BLD: 3.24 M/UL — LOW (ref 4.2–5.4)
RBC # FLD: 16.5 % — HIGH (ref 11.5–14.5)
SODIUM SERPL-SCNC: 138 MMOL/L — SIGNIFICANT CHANGE UP (ref 135–146)
WBC # BLD: 8.63 K/UL — SIGNIFICANT CHANGE UP (ref 4.8–10.8)
WBC # FLD AUTO: 8.63 K/UL — SIGNIFICANT CHANGE UP (ref 4.8–10.8)

## 2018-11-14 RX ADMIN — Medication 650 MILLIGRAM(S): at 23:01

## 2018-11-14 RX ADMIN — Medication 650 MILLIGRAM(S): at 11:49

## 2018-11-14 RX ADMIN — Medication 650 MILLIGRAM(S): at 03:29

## 2018-11-14 RX ADMIN — PANTOPRAZOLE SODIUM 40 MILLIGRAM(S): 20 TABLET, DELAYED RELEASE ORAL at 18:16

## 2018-11-14 RX ADMIN — Medication 120 MILLIGRAM(S): at 06:23

## 2018-11-14 RX ADMIN — Medication 1 DROP(S): at 06:23

## 2018-11-14 RX ADMIN — Medication 1 DROP(S): at 18:16

## 2018-11-14 RX ADMIN — Medication 650 MILLIGRAM(S): at 10:05

## 2018-11-14 RX ADMIN — Medication 650 MILLIGRAM(S): at 23:30

## 2018-11-14 NOTE — PATIENT PROFILE ADULT - VISION (WITH CORRECTIVE LENSES IF THE PATIENT USUALLY WEARS THEM):
Normal vision: sees adequately in most situations; can see medication labels, newsprint L eye glaucoma/Normal vision: sees adequately in most situations; can see medication labels, newsprint

## 2018-11-14 NOTE — PROGRESS NOTE ADULT - SUBJECTIVE AND OBJECTIVE BOX
Patient seen on a housecall over the weekend with weakness and family finding it difficult to get her up standing and ambulating. Patient refused to be taken to ER  Personal h/o hypertension, partial blindness, CKD, anemia of CKD, atiral fibrillation on Eliquis  Found to have severe anemia of 3.8 with positve guaiac and recd packed cell transfusions with some improvement  c/o pain back of neck, no abdominal pain, niece by beside  Abdomen soft, non tender  Heart: atrial fib, rate controlled  GI note appreciated and patient scheduled for EGD on Friday  Off anticoagulants  Out of bed in chair.   Complete Blood Count + Automated Diff (11.13.18 @ 17:12)    WBC Count: 8.34 K/uL    RBC Count: 3.31 M/uL    Hemoglobin: 8.7 g/dL    Hematocrit: 26.3 %    Mean Cell Volume: 79.5 fL    Mean Cell Hemoglobin: 26.3 pg    Mean Cell Hemoglobin Conc: 33.1 g/dL    Red Cell Distrib Width: 16.1 %    Platelet Count - Automated: 285 K/uL    Auto Neutrophil #: 6.14 K/uL    Auto Lymphocyte #: 0.72 K/uL    Auto Monocyte #: 0.99 K/uL    Auto Eosinophil #: 0.40 K/uL    Auto Basophil #: 0.02 K/uL    Auto Neutrophil %: 73.7: Differential percentages must be correlated with absolute numbers for  clinical significance. %    Auto Lymphocyte %: 8.6 %    Auto Monocyte %: 11.9 %    Auto Eosinophil %: 4.8 %    Auto Basophil %: 0.2 %    Auto Immature Granulocyte %: 0.8 %    Doubt any more transfusion needed   eGFR if Non : 20: Interpretative comment  The units for eGFR are ml/min/1.73m2 (normalized body surface area). The  eGFR is calculated from a serum creatinine using the CKD-EPI equation.  Other variables required for calculation are race, age and sex. Among  patients with chronic kidney disease (CKD), the eGFR is useful in  determining the stage of disease according to KDOQI CKD classification.  All eGFR results are reported numerically with the following  interpretation.          GFR                    With                 Without     (ml/min/1.73 m2)    Kidney Damage       Kidney Damage        >= 90                    Stage 1                     Normal        60-89                    Stage 2                     Decreased GFR        30-59     Stage 3                     Stage 3        15-29                    Stage 4                     Stage 4        < 15                      Stage 5                     Stage 5  Each stage of CKD assumes that the associated GFR level has been in  effect for at least 3 months. Determination of stages one and two (with  eGFR > 59 ml/min/m2) requires estimation of kidney damage for at least 3  months as defined by structural or functional abnormalities.  Limitations: All estimates of GFR will be less accurate for patients at  extremes of muscle mass (including but not limited to frail elderly,  critically ill, or cancer patients), those with unusual diets, and those  with conditions associated with reduced secretion or extrarenal  elimination of creatinine. The eGFR equation is not recommended for use  in patients with unstable creatinine levels. mL/min/1.73M2 (11.12.18 @ 16:15)      Creatinine, Serum: 2.0 mg/dL (11.12.18 @ 16:15)

## 2018-11-14 NOTE — PROGRESS NOTE ADULT - SUBJECTIVE AND OBJECTIVE BOX
SUBJECTIVE:    Patient is a 89y old Female who presents with a chief complaint of Generalized weakness (2018 09:26)    Currently admitted to medicine with the primary diagnosis of Anemia     Today is hospital day 2d. This morning she is resting comfortably in bed and reports no new issues or overnight events.     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     ALLERGIES:  No Known Allergies    MEDICATIONS:  STANDING MEDICATIONS  diltiazem    milliGRAM(s) Oral daily  pantoprazole  Injectable 40 milliGRAM(s) IV Push two times a day  timolol 0.5% Solution 1 Drop(s) Both EYES two times a day    PRN MEDICATIONS  acetaminophen   Tablet .. 650 milliGRAM(s) Oral every 6 hours PRN    VITALS:   T(F): 97.8  HR: 79  BP: 126/81  RR: 20  SpO2: 95%    LABS:                        8.7    8.63  )-----------( 262      ( 2018 08:11 )             25.8     11-14    138  |  103  |  53<H>  ----------------------------<  101<H>  4.7   |  22  |  1.6<H>    Ca    8.8      2018 08:11  Phos  3.4     11-14  Mg     2.8     11-14    TPro  5.7<L>  /  Alb  3.3<L>  /  TBili  0.6  /  DBili  x   /  AST  28  /  ALT  30  /  AlkPhos  72  11-13      Urinalysis Basic - ( 2018 07:46 )    Color: Yellow / Appearance: Cloudy / S.020 / pH: x  Gluc: x / Ketone: Negative  / Bili: Negative / Urobili: 0.2 mg/dL   Blood: x / Protein: Negative mg/dL / Nitrite: Negative   Leuk Esterase: Negative / RBC: x / WBC x   Sq Epi: x / Non Sq Epi: x / Bacteria: Moderate /HPF      CARDIAC MARKERS ( 2018 16:15 )  x     / 0.02 ng/mL / 59 U/L / x     / x        PHYSICAL EXAM:  GEN: No acute distress  LUNGS: Clear to auscultation bilaterally   HEART: S1/S2 present. RRR.   ABD: Soft, non-tender  EXT: NC/NC/NE/2+PP/MCCALL normal (ped)...

## 2018-11-14 NOTE — PROGRESS NOTE ADULT - ASSESSMENT
89 yo F with PMHx Afib on xarelto, comes to ED for the weakness    # Microcytic anemia, received 3 transfusions in total, current hgb is 8.7 and stable  - GI recommending EGD, will evaluate once xarelto has transited system as per GI, plan tentatively fo possible EGD on Friday 11/16  - follow up cbc @ AM draw    # Afib, controlled  - HR is controlled with cardizem  - holding xarelto, will speak with pmd, risk vs benefit of xarelto     # Rt shoulder pain and bilateral hip pain   - Tylenol for the pain control    # Glaucoma   - Continue home medications    # ESTRELLITA over CKD  - Baseline Cr was 1.5   - will follow bmp and treat accordingly, this mornings bmp shows creatinine of 1.8, resolving    # Weakness and deconditioning   - PT and rehab     Diet: Full Liquid  Activity: Ambulate as tolerated  DVT ppx: was on xarelto, now held  GI ppx: protonix   Dispo: Home   Full Code

## 2018-11-15 LAB
ANION GAP SERPL CALC-SCNC: 13 MMOL/L — SIGNIFICANT CHANGE UP (ref 7–14)
BASOPHILS # BLD AUTO: 0.03 K/UL — SIGNIFICANT CHANGE UP (ref 0–0.2)
BASOPHILS # BLD AUTO: 0.05 K/UL — SIGNIFICANT CHANGE UP (ref 0–0.2)
BASOPHILS NFR BLD AUTO: 0.3 % — SIGNIFICANT CHANGE UP (ref 0–1)
BASOPHILS NFR BLD AUTO: 0.5 % — SIGNIFICANT CHANGE UP (ref 0–1)
BUN SERPL-MCNC: 42 MG/DL — HIGH (ref 10–20)
CALCIUM SERPL-MCNC: 8.7 MG/DL — SIGNIFICANT CHANGE UP (ref 8.5–10.1)
CHLORIDE SERPL-SCNC: 104 MMOL/L — SIGNIFICANT CHANGE UP (ref 98–110)
CO2 SERPL-SCNC: 24 MMOL/L — SIGNIFICANT CHANGE UP (ref 17–32)
CREAT SERPL-MCNC: 1.3 MG/DL — SIGNIFICANT CHANGE UP (ref 0.7–1.5)
EOSINOPHIL # BLD AUTO: 0.54 K/UL — SIGNIFICANT CHANGE UP (ref 0–0.7)
EOSINOPHIL # BLD AUTO: 0.61 K/UL — SIGNIFICANT CHANGE UP (ref 0–0.7)
EOSINOPHIL NFR BLD AUTO: 5.1 % — SIGNIFICANT CHANGE UP (ref 0–8)
EOSINOPHIL NFR BLD AUTO: 5.9 % — SIGNIFICANT CHANGE UP (ref 0–8)
GLUCOSE SERPL-MCNC: 105 MG/DL — HIGH (ref 70–99)
HCT VFR BLD CALC: 27.4 % — LOW (ref 37–47)
HCT VFR BLD CALC: 28.2 % — LOW (ref 37–47)
HGB BLD-MCNC: 9 G/DL — LOW (ref 12–16)
HGB BLD-MCNC: 9 G/DL — LOW (ref 12–16)
IMM GRANULOCYTES NFR BLD AUTO: 0.5 % — HIGH (ref 0.1–0.3)
IMM GRANULOCYTES NFR BLD AUTO: 0.5 % — HIGH (ref 0.1–0.3)
LYMPHOCYTES # BLD AUTO: 0.79 K/UL — LOW (ref 1.2–3.4)
LYMPHOCYTES # BLD AUTO: 0.95 K/UL — LOW (ref 1.2–3.4)
LYMPHOCYTES # BLD AUTO: 7.5 % — LOW (ref 20.5–51.1)
LYMPHOCYTES # BLD AUTO: 9.2 % — LOW (ref 20.5–51.1)
MAGNESIUM SERPL-MCNC: 2.8 MG/DL — HIGH (ref 1.8–2.4)
MCHC RBC-ENTMCNC: 26.5 PG — LOW (ref 27–31)
MCHC RBC-ENTMCNC: 26.8 PG — LOW (ref 27–31)
MCHC RBC-ENTMCNC: 31.9 G/DL — LOW (ref 32–37)
MCHC RBC-ENTMCNC: 32.8 G/DL — SIGNIFICANT CHANGE UP (ref 32–37)
MCV RBC AUTO: 81.5 FL — SIGNIFICANT CHANGE UP (ref 81–99)
MCV RBC AUTO: 82.9 FL — SIGNIFICANT CHANGE UP (ref 81–99)
MONOCYTES # BLD AUTO: 1.15 K/UL — HIGH (ref 0.1–0.6)
MONOCYTES # BLD AUTO: 1.22 K/UL — HIGH (ref 0.1–0.6)
MONOCYTES NFR BLD AUTO: 10.9 % — HIGH (ref 1.7–9.3)
MONOCYTES NFR BLD AUTO: 11.8 % — HIGH (ref 1.7–9.3)
NEUTROPHILS # BLD AUTO: 7.45 K/UL — HIGH (ref 1.4–6.5)
NEUTROPHILS # BLD AUTO: 7.98 K/UL — HIGH (ref 1.4–6.5)
NEUTROPHILS NFR BLD AUTO: 72.3 % — SIGNIFICANT CHANGE UP (ref 42.2–75.2)
NEUTROPHILS NFR BLD AUTO: 75.5 % — HIGH (ref 42.2–75.2)
NRBC # BLD: 0 /100 WBCS — SIGNIFICANT CHANGE UP (ref 0–0)
PHOSPHATE SERPL-MCNC: 2.9 MG/DL — SIGNIFICANT CHANGE UP (ref 2.1–4.9)
PLATELET # BLD AUTO: 247 K/UL — SIGNIFICANT CHANGE UP (ref 130–400)
PLATELET # BLD AUTO: 259 K/UL — SIGNIFICANT CHANGE UP (ref 130–400)
POTASSIUM SERPL-MCNC: 4.8 MMOL/L — SIGNIFICANT CHANGE UP (ref 3.5–5)
POTASSIUM SERPL-SCNC: 4.8 MMOL/L — SIGNIFICANT CHANGE UP (ref 3.5–5)
RBC # BLD: 3.36 M/UL — LOW (ref 4.2–5.4)
RBC # BLD: 3.4 M/UL — LOW (ref 4.2–5.4)
RBC # FLD: 17.1 % — HIGH (ref 11.5–14.5)
RBC # FLD: 17.2 % — HIGH (ref 11.5–14.5)
SODIUM SERPL-SCNC: 141 MMOL/L — SIGNIFICANT CHANGE UP (ref 135–146)
TYPE + AB SCN PNL BLD: SIGNIFICANT CHANGE UP
WBC # BLD: 10.31 K/UL — SIGNIFICANT CHANGE UP (ref 4.8–10.8)
WBC # BLD: 10.56 K/UL — SIGNIFICANT CHANGE UP (ref 4.8–10.8)
WBC # FLD AUTO: 10.31 K/UL — SIGNIFICANT CHANGE UP (ref 4.8–10.8)
WBC # FLD AUTO: 10.56 K/UL — SIGNIFICANT CHANGE UP (ref 4.8–10.8)

## 2018-11-15 RX ORDER — CHLORHEXIDINE GLUCONATE 213 G/1000ML
1 SOLUTION TOPICAL
Qty: 0 | Refills: 0 | Status: DISCONTINUED | OUTPATIENT
Start: 2018-11-15 | End: 2018-11-18

## 2018-11-15 RX ORDER — PREDNISOLONE SODIUM PHOSPHATE 1 %
1 DROPS OPHTHALMIC (EYE) DAILY
Qty: 0 | Refills: 0 | Status: DISCONTINUED | OUTPATIENT
Start: 2018-11-15 | End: 2018-11-18

## 2018-11-15 RX ORDER — TIMOLOL 0.5 %
1 DROPS OPHTHALMIC (EYE) DAILY
Qty: 0 | Refills: 0 | Status: DISCONTINUED | OUTPATIENT
Start: 2018-11-15 | End: 2018-11-15

## 2018-11-15 RX ORDER — TIMOLOL 0.5 %
1 DROPS OPHTHALMIC (EYE) DAILY
Qty: 0 | Refills: 0 | Status: DISCONTINUED | OUTPATIENT
Start: 2018-11-15 | End: 2018-11-18

## 2018-11-15 RX ADMIN — PANTOPRAZOLE SODIUM 40 MILLIGRAM(S): 20 TABLET, DELAYED RELEASE ORAL at 05:25

## 2018-11-15 RX ADMIN — Medication 120 MILLIGRAM(S): at 05:25

## 2018-11-15 RX ADMIN — Medication 1 DROP(S): at 12:21

## 2018-11-15 RX ADMIN — Medication 650 MILLIGRAM(S): at 09:00

## 2018-11-15 RX ADMIN — PANTOPRAZOLE SODIUM 40 MILLIGRAM(S): 20 TABLET, DELAYED RELEASE ORAL at 17:13

## 2018-11-15 NOTE — PROGRESS NOTE ADULT - ASSESSMENT
89 yo F with PMHx Afib on xarelto, comes to ED for the weakness    # Microcytic anemia, received 3 transfusions in total, current hgb is 8.7 and stable  - GI recommending EGD, will evaluate once xarelto has transited system as per GI, plan tentatively fo possible EGD on Friday 11/16  - follow up cbc  - Active Type and screen    # Afib, controlled  - HR is controlled with cardizem  - holding xarelto, will speak with pmd, risk vs benefit of xarelto     # Rt shoulder pain and bilateral hip pain   - Tylenol for the pain control    # Glaucoma   - Continue home medications    # ESTRELLITA over CKD - resolved  - Creatinine, Serum: 1.3 mg/dL (11.15.18 @ 07:55)  - baseline Cr 1.5    # Weakness and deconditioning   - PT and rehab     Diet: Full Liquid  Activity: Ambulate as tolerated  DVT ppx: was on xarelto, now held  GI ppx: protonix   Dispo: Home   Full Code

## 2018-11-15 NOTE — PROGRESS NOTE ADULT - ASSESSMENT
89 yo F with PMHx of CKD, Afib on xarelto last dose 11/12/2018 AM , glaucoma partially blind comes to ED for the generalized weakness. On admission the hemoglobin was 3.7. In ed found to have melena on rectal exam.     Melena - likely Upper GI bleed - Resolved   Vital signs - stable , no hypotension, no tachycardia   Assc with Acute on chronic microcytic anemia - Hb was 9.9 in feb 2018 and then 8.5 in june 2018 came in with 3.7   s/p 3 unit prbc , Hb stable at 8.7   rec   liquid diet for now   continue with IV protonix drip  2 18 G IV access  Active type and screen  cbc q12, transfuse to keep HB >8  hold xarelto for now   Will need EGD once Hb is around 8 and xarelto held for total of 4 days duration  Will schedule pt for egd tomorrow , keep NPO after midnight   In the meanwhile if patient develops active GI bleed with unstable vitals , down trending hb will plan for emergent egd.  Attending rec to follow

## 2018-11-15 NOTE — PROGRESS NOTE ADULT - SUBJECTIVE AND OBJECTIVE BOX
GI HPI Today:  Patient is a 89y old  Female who presents with a chief complaint of Generalized weakness (14 Nov 2018 11:34)  GI following the patient for melena, upper GI bleed.   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 650 milliGRAM(s) Oral every 6 hours PRN Severe Pain (7 - 10)      REVIEW OF SYSTEMS  General:  No fevers  Eyes:  No reported pain   ENT:  No sore throat   NECK: No stiffness   CV:  No chest pain   Resp:  No shortness of breath  GI:  See HPI  :  No dysuria  Muscle:  No aches or weakness  Neuro:  No tingling  Endocrine:  No polyuria  Heme:  No ecchymosis   All other review of systems is negative unless indicated above.       VITALS:   T(F): 96.4 (11-15 @ 05:10), Max: 98.1 (11-12 @ 15:41)  HR: 89 (11-15 @ 05:10) (77 - 94)  BP: 145/72 (11-15 @ 05:10) (85/42 - 145/72)  BP(mean): --  RR: 18 (11-15 @ 05:10) (16 - 20)  SpO2: 95% (11-14 @ 08:00) (95% - 99%)        PHYSICAL EXAM:  EYES: No scleral icterus   LUNG: Clear to auscultation bilaterally; No rales, rhonchi, wheezing, or rubs  HEART: RRR; s1 and s2 heard   ABDOMEN: Soft, +BS, no abdominal pain, no  Abdominal Tenderness, No guarding, No Canales Sign   Rectal Exam: not performed      Blood Work :                        8.7    8.63  )-----------( 262      ( 14 Nov 2018 08:11 )             25.8       11-14    138  |  103  |  53<H>  ----------------------------<  101<H>  4.7   |  22  |  1.6<H>    Ca    8.8      14 Nov 2018 08:11  Phos  3.4     11-14  Mg     2.8     11-14      CBC -  ( 14 Nov 2018 08:11 )  Hemoglobin : 8.7    CBC -  ( 13 Nov 2018 17:12 )  Hemoglobin : 8.7    CBC -  ( 13 Nov 2018 07:54 )  Hemoglobin : 6.7    CBC -  ( 12 Nov 2018 16:15 )  Hemoglobin : 3.7      LIVER FUNCTIONS - ( 13 Nov 2018 07:54 )  Alb: 3.3 [3.5 - 5.2] / Pro: 5.7 [6.0 - 8.0] / ALK PHOS: 72 [30 - 115] / ALT: 30 [0 - 41] / AST: 28 [0 - 41] / GGT: x     LIVER FUNCTIONS - ( 12 Nov 2018 16:15 )  Alb: 3.6 [3.5 - 5.2] / Pro: 6.2 [6.0 - 8.0] / ALK PHOS: 73 [30 - 115] / ALT: 28 [0 - 41] / AST: 28 [0 - 41] / GGT: x         RADIOLOGY:  < from: Xray Chest 1 View AP/PA (11.12.18 @ 19:06) >  Impression:      Pulmonary vascular congestion with small pleural effusions.    < end of copied text > GI HPI Today:  Patient is a 89y old  Female who presents with a chief complaint of Generalized weakness (14 Nov 2018 11:34)  GI following the patient for melena, upper GI bleed.   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 650 milliGRAM(s) Oral every 6 hours PRN Severe Pain (7 - 10)      REVIEW OF SYSTEMS  General:  No fevers  Eyes:  No reported pain   ENT:  No sore throat   NECK: No stiffness   CV:  No chest pain   Resp:  No shortness of breath  GI:  See HPI  :  No dysuria  Muscle:  No aches or weakness  Neuro:  No tingling  Endocrine:  No polyuria  Heme:  No ecchymosis   All other review of systems is negative unless indicated above.       VITALS:   T(F): 96.4 (11-15 @ 05:10), Max: 98.1 (11-12 @ 15:41)  HR: 89 (11-15 @ 05:10) (77 - 94)  BP: 145/72 (11-15 @ 05:10) (85/42 - 145/72)  BP(mean): --  RR: 18 (11-15 @ 05:10) (16 - 20)  SpO2: 95% (11-14 @ 08:00) (95% - 99%)        PHYSICAL EXAM:  EYES: No scleral icterus   LUNG: Clear to auscultation bilaterally; No rales, rhonchi, wheezing, or rubs  HEART: RRR; s1 and s2 heard   ABDOMEN: Soft, +BS, no abdominal pain, no  Abdominal Tenderness, No guarding, No Canales Sign   Rectal Exam: not performed      Blood Work :                        8.7    8.63  )-----------( 262      ( 14 Nov 2018 08:11 )             25.8       11-14    138  |  103  |  53<H>  ----------------------------<  101<H>  4.7   |  22  |  1.6<H>    Ca    8.8      14 Nov 2018 08:11  Phos  3.4     11-14  Mg     2.8     11-14      CBC -  ( 14 Nov 2018 08:11 )  Hemoglobin : 8.7    CBC -  ( 13 Nov 2018 17:12 )  Hemoglobin : 8.7    CBC -  ( 13 Nov 2018 07:54 )  Hemoglobin : 6.7    CBC -  ( 12 Nov 2018 16:15 )  Hemoglobin : 3.7      LIVER FUNCTIONS - ( 13 Nov 2018 07:54 )  Alb: 3.3 [3.5 - 5.2] / Pro: 5.7 [6.0 - 8.0] / ALK PHOS: 72 [30 - 115] / ALT: 30 [0 - 41] / AST: 28 [0 - 41] / GGT: x     LIVER FUNCTIONS - ( 12 Nov 2018 16:15 )  Alb: 3.6 [3.5 - 5.2] / Pro: 6.2 [6.0 - 8.0] / ALK PHOS: 73 [30 - 115] / ALT: 28 [0 - 41] / AST: 28 [0 - 41] / GGT: x         RADIOLOGY:  < from: Xray Chest 1 View AP/PA (11.12.18 @ 19:06) >  Impression:      Pulmonary vascular congestion with small pleural effusions.    < end of copied text >

## 2018-11-15 NOTE — PROGRESS NOTE ADULT - SUBJECTIVE AND OBJECTIVE BOX
Patient feels improvement after improvement in anemia.  Ambulating with assistance  In atrial fib, rate well controlled, off AC  EGD tomorrow

## 2018-11-15 NOTE — PROGRESS NOTE ADULT - SUBJECTIVE AND OBJECTIVE BOX
LEYDI LEE 89y Female  MRN#: [id number]       SUBJECTIVE  Patient is a 89y old Female who presents with a chief complaint of Generalized weakness (15 Nov 2018 08:34)    she is currently admitted to medicine with the primary diagnosis of Anemia    Today is hospital day 3d, and this morning she is lying in bed without distress.    No acute overnight events.     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 650 milliGRAM(s) Oral every 6 hours PRN    HOME MEDICATIONS  Home Medications:  dilTIAZem 120 mg/24 hours oral tablet, extended release: 1 tab(s) orally once a day (12 Nov 2018 22:43)  timolol hemihydrate 0.5% ophthalmic solution: 1 drop(s) to each affected eye 2 times a day (12 Nov 2018 22:42)  Xarelto 15 mg oral tablet: 1 tab(s) orally once a day (in the evening) (12 Nov 2018 22:42)      VITAL SIGNS: Last 24 Hours  T(C): 36.4 (15 Nov 2018 13:10), Max: 36.4 (15 Nov 2018 13:10)  T(F): 97.5 (15 Nov 2018 13:10), Max: 97.5 (15 Nov 2018 13:10)  HR: 68 (15 Nov 2018 13:10) (68 - 94)  BP: 114/61 (15 Nov 2018 13:10) (114/61 - 145/72)  BP(mean): --  RR: 18 (15 Nov 2018 13:10) (18 - 20)  SpO2: --    LABS:                        9.0    10.56 )-----------( 259      ( 15 Nov 2018 07:55 )             27.4     11-15    141  |  104  |  42<H>  ----------------------------<  105<H>  4.8   |  24  |  1.3    Ca    8.7      15 Nov 2018 07:55  Phos  2.9     11-15  Mg     2.8     11-15      CAPILLARY BLOOD GLUCOSE        RADIOLOGY:      PHYSICAL EXAM:    GENERAL: NAD, elderly, nearly blind  HEENT:  Atraumatic, Normocephalic. EOMI. No JVD  PULMONARY: Clear to auscultation bilaterally; No wheeze  CARDIOVASCULAR: Regular rate and rhythm  GASTROINTESTINAL: Soft, Nontender, Nondistended  MUSCULOSKELETAL:  2+ Peripheral Pulses, No clubbing, cyanosis. 2+ Pitting edema   NEUROLOGY: non-focal      ADMISSION SUMMARY  Patient is a 89y old Female who presents with a chief complaint of Generalized weakness (15 Nov 2018 08:34)     she currently admitted to medicine with the primary diagnosis of Anemia

## 2018-11-16 LAB
ANION GAP SERPL CALC-SCNC: 11 MMOL/L — SIGNIFICANT CHANGE UP (ref 7–14)
BASOPHILS # BLD AUTO: 0.05 K/UL — SIGNIFICANT CHANGE UP (ref 0–0.2)
BASOPHILS # BLD AUTO: 0.06 K/UL — SIGNIFICANT CHANGE UP (ref 0–0.2)
BASOPHILS NFR BLD AUTO: 0.6 % — SIGNIFICANT CHANGE UP (ref 0–1)
BASOPHILS NFR BLD AUTO: 0.7 % — SIGNIFICANT CHANGE UP (ref 0–1)
BLD GP AB SCN SERPL QL: SIGNIFICANT CHANGE UP
BUN SERPL-MCNC: 35 MG/DL — HIGH (ref 10–20)
CALCIUM SERPL-MCNC: 8.7 MG/DL — SIGNIFICANT CHANGE UP (ref 8.5–10.1)
CHLORIDE SERPL-SCNC: 104 MMOL/L — SIGNIFICANT CHANGE UP (ref 98–110)
CO2 SERPL-SCNC: 27 MMOL/L — SIGNIFICANT CHANGE UP (ref 17–32)
CREAT SERPL-MCNC: 1.4 MG/DL — SIGNIFICANT CHANGE UP (ref 0.7–1.5)
EOSINOPHIL # BLD AUTO: 0.5 K/UL — SIGNIFICANT CHANGE UP (ref 0–0.7)
EOSINOPHIL # BLD AUTO: 0.61 K/UL — SIGNIFICANT CHANGE UP (ref 0–0.7)
EOSINOPHIL NFR BLD AUTO: 5.5 % — SIGNIFICANT CHANGE UP (ref 0–8)
EOSINOPHIL NFR BLD AUTO: 7.1 % — SIGNIFICANT CHANGE UP (ref 0–8)
GLUCOSE SERPL-MCNC: 89 MG/DL — SIGNIFICANT CHANGE UP (ref 70–99)
HCT VFR BLD CALC: 26.5 % — LOW (ref 37–47)
HCT VFR BLD CALC: 28.7 % — LOW (ref 37–47)
HGB BLD-MCNC: 8.3 G/DL — LOW (ref 12–16)
HGB BLD-MCNC: 8.9 G/DL — LOW (ref 12–16)
IMM GRANULOCYTES NFR BLD AUTO: 0.4 % — HIGH (ref 0.1–0.3)
IMM GRANULOCYTES NFR BLD AUTO: 0.6 % — HIGH (ref 0.1–0.3)
INR BLD: 1.33 RATIO — HIGH (ref 0.65–1.3)
INR BLD: 1.46 RATIO — HIGH (ref 0.65–1.3)
LYMPHOCYTES # BLD AUTO: 0.94 K/UL — LOW (ref 1.2–3.4)
LYMPHOCYTES # BLD AUTO: 1.17 K/UL — LOW (ref 1.2–3.4)
LYMPHOCYTES # BLD AUTO: 10.3 % — LOW (ref 20.5–51.1)
LYMPHOCYTES # BLD AUTO: 13.7 % — LOW (ref 20.5–51.1)
MAGNESIUM SERPL-MCNC: 2.7 MG/DL — HIGH (ref 1.8–2.4)
MCHC RBC-ENTMCNC: 25.9 PG — LOW (ref 27–31)
MCHC RBC-ENTMCNC: 26.2 PG — LOW (ref 27–31)
MCHC RBC-ENTMCNC: 31 G/DL — LOW (ref 32–37)
MCHC RBC-ENTMCNC: 31.3 G/DL — LOW (ref 32–37)
MCV RBC AUTO: 83.6 FL — SIGNIFICANT CHANGE UP (ref 81–99)
MCV RBC AUTO: 83.7 FL — SIGNIFICANT CHANGE UP (ref 81–99)
MONOCYTES # BLD AUTO: 1.06 K/UL — HIGH (ref 0.1–0.6)
MONOCYTES # BLD AUTO: 1.2 K/UL — HIGH (ref 0.1–0.6)
MONOCYTES NFR BLD AUTO: 11.7 % — HIGH (ref 1.7–9.3)
MONOCYTES NFR BLD AUTO: 14 % — HIGH (ref 1.7–9.3)
NEUTROPHILS # BLD AUTO: 5.51 K/UL — SIGNIFICANT CHANGE UP (ref 1.4–6.5)
NEUTROPHILS # BLD AUTO: 6.48 K/UL — SIGNIFICANT CHANGE UP (ref 1.4–6.5)
NEUTROPHILS NFR BLD AUTO: 64.2 % — SIGNIFICANT CHANGE UP (ref 42.2–75.2)
NEUTROPHILS NFR BLD AUTO: 71.2 % — SIGNIFICANT CHANGE UP (ref 42.2–75.2)
NRBC # BLD: 0 /100 WBCS — SIGNIFICANT CHANGE UP (ref 0–0)
PLATELET # BLD AUTO: 239 K/UL — SIGNIFICANT CHANGE UP (ref 130–400)
PLATELET # BLD AUTO: 250 K/UL — SIGNIFICANT CHANGE UP (ref 130–400)
POTASSIUM SERPL-MCNC: 5.8 MMOL/L — HIGH (ref 3.5–5)
POTASSIUM SERPL-SCNC: 5.8 MMOL/L — HIGH (ref 3.5–5)
PROTHROM AB SERPL-ACNC: 15.3 SEC — HIGH (ref 9.95–12.87)
PROTHROM AB SERPL-ACNC: 16.7 SEC — HIGH (ref 9.95–12.87)
RBC # BLD: 3.17 M/UL — LOW (ref 4.2–5.4)
RBC # BLD: 3.43 M/UL — LOW (ref 4.2–5.4)
RBC # FLD: 17.4 % — HIGH (ref 11.5–14.5)
RBC # FLD: 17.6 % — HIGH (ref 11.5–14.5)
SODIUM SERPL-SCNC: 142 MMOL/L — SIGNIFICANT CHANGE UP (ref 135–146)
TYPE + AB SCN PNL BLD: SIGNIFICANT CHANGE UP
WBC # BLD: 8.57 K/UL — SIGNIFICANT CHANGE UP (ref 4.8–10.8)
WBC # BLD: 9.09 K/UL — SIGNIFICANT CHANGE UP (ref 4.8–10.8)
WBC # FLD AUTO: 8.57 K/UL — SIGNIFICANT CHANGE UP (ref 4.8–10.8)
WBC # FLD AUTO: 9.09 K/UL — SIGNIFICANT CHANGE UP (ref 4.8–10.8)

## 2018-11-16 RX ORDER — SODIUM CHLORIDE 9 MG/ML
1000 INJECTION, SOLUTION INTRAVENOUS
Qty: 0 | Refills: 0 | Status: DISCONTINUED | OUTPATIENT
Start: 2018-11-16 | End: 2018-11-16

## 2018-11-16 RX ADMIN — Medication 120 MILLIGRAM(S): at 05:48

## 2018-11-16 RX ADMIN — CHLORHEXIDINE GLUCONATE 1 APPLICATION(S): 213 SOLUTION TOPICAL at 06:50

## 2018-11-16 RX ADMIN — Medication 650 MILLIGRAM(S): at 22:00

## 2018-11-16 RX ADMIN — PANTOPRAZOLE SODIUM 40 MILLIGRAM(S): 20 TABLET, DELAYED RELEASE ORAL at 18:03

## 2018-11-16 RX ADMIN — PANTOPRAZOLE SODIUM 40 MILLIGRAM(S): 20 TABLET, DELAYED RELEASE ORAL at 05:48

## 2018-11-16 RX ADMIN — Medication 650 MILLIGRAM(S): at 21:36

## 2018-11-16 NOTE — CHART NOTE - NSCHARTNOTEFT_GEN_A_CORE
EGD completed on 11/16  Impressions:  -normal entire esophagus  -mild gastritis was found in stomach. Multiple biopsies taken  -normal duodenal bulb and 2nd portion of the duodenum. 2 bx taken    Recommendations:  -avoid all NSAIDs   - EGD completed on 11/16  Impressions:  -normal entire esophagus  -mild gastritis was found in stomach. Multiple biopsies taken  -normal duodenal bulb and 2nd portion of the duodenum. 2 bx taken    Recommendations:  -avoid all NSAIDs   -begin protonix 40mg PO twice a day  -follow up on the results of bx specimen   -colonoscopy on Monday  -f/u w/ [facility]

## 2018-11-16 NOTE — PROGRESS NOTE ADULT - ASSESSMENT
89 yo F with PMHx Afib on xarelto, comes to ED for the weakness.    # Microcytic anemia, s/p 3 transfusions in total,   - Hemoglobin: 9.0 g/dL (11.15.18 @ 17:47)  - EGD today  - follow up cbc  - Active Type and screen    # Afib, rate-controlled  - HR 68-80  - HR is controlled with cardizem  - holding xarelto, discussed with family regarding risk vs benefit of restarting AC. Will discuss with PMD/Cardiologist rather to restart or discontinue    # Rt shoulder pain and bilateral hip pain   - Tylenol for the pain control    # Glaucoma   - Continue home medications    # ESTRELLITA over CKD - resolved  - Creatinine, Serum: 1.3 mg/dL (11.15.18 @ 07:55)  - baseline Cr 1.5    # Weakness and deconditioning   - PT and rehab     # Diet: Full Liquid  # Activity: Ambulate as tolerated  # DVT ppx: was on xarelto, now held  # GI ppx: protonix   # Dispo: Home   # Full Code 89 yo F with PMHx Afib on xarelto, comes to ED for the weakness.    # Microcytic anemia, s/p 3 transfusions in total,   - Hemoglobin: 8.3 g/dL (11.16.18 @ 08:18)  - EGD completed, no evidence of bleed.   - Family to decide if colonoscopy to be done on Monday.  - follow up cbc  - Active Type and screen    # Afib, rate-controlled  - HR 68-80  - HR is controlled with cardizem  - holding xarelto, discussed with family regarding risk vs benefit of restarting AC. Will discuss with PMD/Cardiologist rather to restart or discontinue    # Rt shoulder pain and bilateral hip pain   - Tylenol for the pain control    # Glaucoma   - Continue home medications    # ESTRELLITA over CKD - resolved  - Creatinine, Serum: 1.3 mg/dL (11.15.18 @ 07:55)  - baseline Cr 1.5    # Weakness and deconditioning   - PT and rehab     # Diet: Full Liquid  # Activity: Ambulate as tolerated  # DVT ppx: was on xarelto, now held  # GI ppx: protonix   # Dispo: Home   # Full Code

## 2018-11-16 NOTE — PROGRESS NOTE ADULT - SUBJECTIVE AND OBJECTIVE BOX
LEYDI LEE 89y Female  MRN#: [id number]       SUBJECTIVE  Patient is a 89y old Female who presents with a chief complaint of Generalized weakness (15 Nov 2018 18:11)    she is currently admitted to medicine with the primary diagnosis of Anemia    Today is hospital day 4d, and this morning she is lying in bed with her daughter by bedside. Pt has no complaints. Discussed with daughter about plan after EGD that is scheduled for today. Brought in the idea of whether to restart xarelto given patient's age.     No acute overnight events.     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 650 milliGRAM(s) Oral every 6 hours PRN    HOME MEDICATIONS  Home Medications:  dilTIAZem 120 mg/24 hours oral tablet, extended release: 1 tab(s) orally once a day (12 Nov 2018 22:43)  timolol hemihydrate 0.5% ophthalmic solution: 1 drop(s) to each affected eye 2 times a day (12 Nov 2018 22:42)  Xarelto 15 mg oral tablet: 1 tab(s) orally once a day (in the evening) (12 Nov 2018 22:42)      VITAL SIGNS: Last 24 Hours  T(C): 35.9 (16 Nov 2018 05:04), Max: 36.6 (15 Nov 2018 20:16)  T(F): 96.7 (16 Nov 2018 05:04), Max: 97.8 (15 Nov 2018 20:16)  HR: 80 (16 Nov 2018 05:04) (68 - 95)  BP: 122/58 (16 Nov 2018 05:04) (114/61 - 153/68)  BP(mean): --  RR: 18 (16 Nov 2018 05:04) (18 - 18)  SpO2: 94% (15 Nov 2018 19:27) (94% - 94%)    LABS:                        9.0    10.31 )-----------( 247      ( 15 Nov 2018 17:47 )             28.2     11-15    141  |  104  |  42<H>  ----------------------------<  105<H>  4.8   |  24  |  1.3    Ca    8.7      15 Nov 2018 07:55  Phos  2.9     11-15  Mg     2.8     11-15    PT/INR - ( 16 Nov 2018 01:53 )   PT: 16.70 sec;   INR: 1.46 ratio      CAPILLARY BLOOD GLUCOSE        RADIOLOGY:      PHYSICAL EXAM:    GENERAL: NAD, elderly, nearly blind  HEENT:  Atraumatic, Normocephalic. EOMI. No JVD  PULMONARY: Clear to auscultation bilaterally; No wheeze  CARDIOVASCULAR: Regular rate and rhythm; No murmurs.  GASTROINTESTINAL: Soft, Nontender, Nondistended  MUSCULOSKELETAL:  2+ Peripheral Pulses, No clubbing, cyanosis, or edema. 1+ pitting edema  NEUROLOGY: non-focal      ADMISSION SUMMARY  Patient is a 89y old Female who presents with a chief complaint of Generalized weakness (15 Nov 2018 18:11)     she currently admitted to medicine with the primary diagnosis of Anemia LEYDI LEE 89y Female  MRN#: [id number]       SUBJECTIVE  Patient is a 89y old Female who presents with a chief complaint of Generalized weakness (15 Nov 2018 18:11)    she is currently admitted to medicine with the primary diagnosis of Anemia    Today is hospital day 4d, and this morning she is lying in bed with her daughter by bedside. Pt has no complaints. Discussed with daughter about plan after EGD that is scheduled for today. Brought in the idea of whether to restart xarelto given patient's age.     No acute overnight events.     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 650 milliGRAM(s) Oral every 6 hours PRN    HOME MEDICATIONS  Home Medications:  dilTIAZem 120 mg/24 hours oral tablet, extended release: 1 tab(s) orally once a day (12 Nov 2018 22:43)  timolol hemihydrate 0.5% ophthalmic solution: 1 drop(s) to each affected eye 2 times a day (12 Nov 2018 22:42)  Xarelto 15 mg oral tablet: 1 tab(s) orally once a day (in the evening) (12 Nov 2018 22:42)      VITAL SIGNS: Last 24 Hours  T(C): 35.9 (16 Nov 2018 05:04), Max: 36.6 (15 Nov 2018 20:16)  T(F): 96.7 (16 Nov 2018 05:04), Max: 97.8 (15 Nov 2018 20:16)  HR: 80 (16 Nov 2018 05:04) (68 - 95)  BP: 122/58 (16 Nov 2018 05:04) (114/61 - 153/68)  BP(mean): --  RR: 18 (16 Nov 2018 05:04) (18 - 18)  SpO2: 94% (15 Nov 2018 19:27) (94% - 94%)    LABS:                        9.0    10.31 )-----------( 247      ( 15 Nov 2018 17:47 )             28.2     11-15    141  |  104  |  42<H>  ----------------------------<  105<H>  4.8   |  24  |  1.3    Ca    8.7      15 Nov 2018 07:55  Phos  2.9     11-15  Mg     2.8     11-15    PT/INR - ( 16 Nov 2018 01:53 )   PT: 16.70 sec;   INR: 1.46 ratio      CAPILLARY BLOOD GLUCOSE        RADIOLOGY:    EGD completed on 11/16  Impressions:  -normal entire esophagus  -mild gastritis was found in stomach. Multiple biopsies taken  -normal duodenal bulb and 2nd portion of the duodenum. 2 bx taken    Recommendations:  -avoid all NSAIDs   -begin protonix 40mg PO twice a day  -follow up on the results of bx specimen   -colonoscopy on Monday  -[facility]/u w/ [facility].    PHYSICAL EXAM:    GENERAL: NAD, elderly, nearly blind  HEENT:  Atraumatic, Normocephalic. EOMI. No JVD  PULMONARY: Clear to auscultation bilaterally; No wheeze  CARDIOVASCULAR: Regular rate and rhythm; No murmurs.  GASTROINTESTINAL: Soft, Nontender, Nondistended  MUSCULOSKELETAL:  2+ Peripheral Pulses, No clubbing, cyanosis, or edema. 1+ pitting edema  NEUROLOGY: non-focal      ADMISSION SUMMARY  Patient is a 89y old Female who presents with a chief complaint of Generalized weakness (15 Nov 2018 18:11)     she currently admitted to medicine with the primary diagnosis of Anemia

## 2018-11-16 NOTE — CHART NOTE - NSCHARTNOTEFT_GEN_A_CORE
PACU ANESTHESIA ADMISSION NOTE      Procedure:   Post op diagnosis:      ____  Intubated  TV:______       Rate: ______      FiO2: ______    _x___  Patent Airway    _x___  Full return of protective reflexes    _x___  Full recovery from anesthesia / back to baseline     Vitals:   T:           R:18                  BP:  121/79                Sat:97                   P:79       Mental Status:  __x__ Awake  x _____ Alert   _____ Drowsy   _____ Sedated    Nausea/Vomiting:  _x___ NO  ______Yes,   See Post - Op Orders          Pain Scale (0-10):  _____0    Treatment: ____ None    ____ See Post - Op/PCA Orders    Post - Operative Fluids:   ____ Oral   ____ See Post - Op Orders    Plan: Discharge:   ____Home   x    _____Floor     _____Critical Care    _____  Other:_________________    Comments:

## 2018-11-17 LAB
ANION GAP SERPL CALC-SCNC: 12 MMOL/L — SIGNIFICANT CHANGE UP (ref 7–14)
BUN SERPL-MCNC: 29 MG/DL — HIGH (ref 10–20)
CALCIUM SERPL-MCNC: 8.7 MG/DL — SIGNIFICANT CHANGE UP (ref 8.5–10.1)
CHLORIDE SERPL-SCNC: 103 MMOL/L — SIGNIFICANT CHANGE UP (ref 98–110)
CO2 SERPL-SCNC: 26 MMOL/L — SIGNIFICANT CHANGE UP (ref 17–32)
CREAT SERPL-MCNC: 1.3 MG/DL — SIGNIFICANT CHANGE UP (ref 0.7–1.5)
GLUCOSE SERPL-MCNC: 124 MG/DL — HIGH (ref 70–99)
HCT VFR BLD CALC: 27.1 % — LOW (ref 37–47)
HGB BLD-MCNC: 8.5 G/DL — LOW (ref 12–16)
MCHC RBC-ENTMCNC: 26.2 PG — LOW (ref 27–31)
MCHC RBC-ENTMCNC: 31.4 G/DL — LOW (ref 32–37)
MCV RBC AUTO: 83.4 FL — SIGNIFICANT CHANGE UP (ref 81–99)
NRBC # BLD: 0 /100 WBCS — SIGNIFICANT CHANGE UP (ref 0–0)
PLATELET # BLD AUTO: 223 K/UL — SIGNIFICANT CHANGE UP (ref 130–400)
POTASSIUM SERPL-MCNC: 4.9 MMOL/L — SIGNIFICANT CHANGE UP (ref 3.5–5)
POTASSIUM SERPL-SCNC: 4.9 MMOL/L — SIGNIFICANT CHANGE UP (ref 3.5–5)
RBC # BLD: 3.25 M/UL — LOW (ref 4.2–5.4)
RBC # FLD: 17.4 % — HIGH (ref 11.5–14.5)
SODIUM SERPL-SCNC: 141 MMOL/L — SIGNIFICANT CHANGE UP (ref 135–146)
WBC # BLD: 9.8 K/UL — SIGNIFICANT CHANGE UP (ref 4.8–10.8)
WBC # FLD AUTO: 9.8 K/UL — SIGNIFICANT CHANGE UP (ref 4.8–10.8)

## 2018-11-17 RX ORDER — HEPARIN SODIUM 5000 [USP'U]/ML
5000 INJECTION INTRAVENOUS; SUBCUTANEOUS EVERY 12 HOURS
Qty: 0 | Refills: 0 | Status: DISCONTINUED | OUTPATIENT
Start: 2018-11-17 | End: 2018-11-18

## 2018-11-17 RX ORDER — BENZOCAINE AND MENTHOL 5; 1 G/100ML; G/100ML
1 LIQUID ORAL ONCE
Qty: 0 | Refills: 0 | Status: COMPLETED | OUTPATIENT
Start: 2018-11-17 | End: 2018-11-17

## 2018-11-17 RX ORDER — PANTOPRAZOLE SODIUM 20 MG/1
1 TABLET, DELAYED RELEASE ORAL
Qty: 60 | Refills: 0 | OUTPATIENT
Start: 2018-11-17 | End: 2018-12-16

## 2018-11-17 RX ORDER — FONDAPARINUX SODIUM 2.5 MG/.5ML
1 INJECTION, SOLUTION SUBCUTANEOUS
Qty: 0 | Refills: 0 | COMMUNITY

## 2018-11-17 RX ORDER — PANTOPRAZOLE SODIUM 20 MG/1
40 TABLET, DELAYED RELEASE ORAL
Qty: 0 | Refills: 0 | Status: DISCONTINUED | OUTPATIENT
Start: 2018-11-17 | End: 2018-11-18

## 2018-11-17 RX ADMIN — BENZOCAINE AND MENTHOL 1 LOZENGE: 5; 1 LIQUID ORAL at 17:54

## 2018-11-17 RX ADMIN — Medication 120 MILLIGRAM(S): at 06:27

## 2018-11-17 RX ADMIN — Medication 650 MILLIGRAM(S): at 04:49

## 2018-11-17 RX ADMIN — PANTOPRAZOLE SODIUM 40 MILLIGRAM(S): 20 TABLET, DELAYED RELEASE ORAL at 06:25

## 2018-11-17 RX ADMIN — PANTOPRAZOLE SODIUM 40 MILLIGRAM(S): 20 TABLET, DELAYED RELEASE ORAL at 17:54

## 2018-11-17 RX ADMIN — Medication 1 DROP(S): at 21:47

## 2018-11-17 RX ADMIN — CHLORHEXIDINE GLUCONATE 1 APPLICATION(S): 213 SOLUTION TOPICAL at 06:42

## 2018-11-17 RX ADMIN — HEPARIN SODIUM 5000 UNIT(S): 5000 INJECTION INTRAVENOUS; SUBCUTANEOUS at 17:54

## 2018-11-17 RX ADMIN — Medication 650 MILLIGRAM(S): at 05:20

## 2018-11-17 NOTE — DISCHARGE NOTE ADULT - FINDINGS/TREATMENT
EGD completed on 11/16  Impressions:  -normal entire esophagus  -mild gastritis was found in stomach. Multiple biopsies taken  -normal duodenal bulb and 2nd portion of the duodenum. 2 bx taken    Recommendations:  -avoid all NSAIDs   -begin protonix 40mg PO twice a day  -follow up on the results of bx specimen   -colonoscopy on Monday  -f/u w/ [facility].

## 2018-11-17 NOTE — DISCHARGE NOTE ADULT - MEDICATION SUMMARY - MEDICATIONS TO TAKE
I will START or STAY ON the medications listed below when I get home from the hospital:    dilTIAZem 120 mg/24 hours oral tablet, extended release  -- 1 tab(s) by mouth once a day  -- Indication: For AFIB    timolol hemihydrate 0.5% ophthalmic solution  -- 1 drop(s) to each affected eye 2 times a day  -- Indication: For glaucoma    pantoprazole 40 mg oral delayed release tablet  -- 1 tab(s) by mouth 2 times a day  -- Indication: For gerd I will START or STAY ON the medications listed below when I get home from the hospital:    dilTIAZem 120 mg/24 hours oral tablet, extended release  -- 1 tab(s) by mouth once a day  -- Indication: For Afib    furosemide 40 mg oral tablet  -- 1 tab(s) by mouth once a day  -- Indication: For Chf    timolol hemihydrate 0.5% ophthalmic solution  -- 1 drop(s) to each affected eye 2 times a day  -- Indication: For Glaucoma    pantoprazole 40 mg oral delayed release tablet  -- 1 tab(s) by mouth 2 times a day  -- Indication: For Gerd

## 2018-11-17 NOTE — DIETITIAN INITIAL EVALUATION ADULT. - ENERGY NEEDS
Estimated Calorie Needs: [id number] x AF 1.2-1.8=[id number]/day  Estimated Protein Needs: 52-65grams/day (0.8-1grams/kg of admit weight)  Estimated Fluid Needs: 1163-1260mL/day (1mL/kcal)

## 2018-11-17 NOTE — DIETITIAN INITIAL EVALUATION ADULT. - ORAL INTAKE PTA
good/Per family, the patient followed a regular diet at home; consumed three meals at 100%; denies MVI use.

## 2018-11-17 NOTE — DIETITIAN INITIAL EVALUATION ADULT. - PERTINENT LABORATORY DATA
(11/17) Na-141, K-4.9, CL-103, BUN-29 ,Cr-1.3, GFR-34, Glucose-124mg/dL, Ca-8.7, H/H-8.5/27.1, INR-1.33

## 2018-11-17 NOTE — DIETITIAN INITIAL EVALUATION ADULT. - OTHER INFO
89y/o female with pmhx listed below presented with a chief complaint of generalized weakness. Found to have anemia, melena and upper GI bleed, s/p 3 units PRBC provided; GI following. S/p EGD, results show gastritis. A colonoscopy is scheduled for (11/19). Skin is intact (Raj Score-16).

## 2018-11-17 NOTE — DISCHARGE NOTE ADULT - CARE PLAN
Goal:	Stable. Medical management. Follow up with your primary care doctor.  Assessment and plan of treatment:	You were admitted to the hospital for low hemoglobin. You had three unit of blood transfused. On physical exam, you were found to have blood in your rectum in the emergency room. EGD was completed and did not show any bleed. Decision was made not to proceed with colonoscopy. Please take your medications as prescribed. Please follow up with your primary care doctor in 1 week to monitor your hemoglobin.  Goal:	Stable. Medical management. Follow up with your primary care doctor.  Assessment and plan of treatment:	Decision was made to discontinue your blood thinner medication (Xarelto). Please take your medications as prescribed. Please follow up with your primary care doctor for follow up.  Goal:	Stable. Medical management. Follow up with your primary care doctor.  Assessment and plan of treatment:	Please take your medications as prescribed. Please follow up with your primary care doctor for follow up.  Goal:	Stable. Medical management. Please follow up with your primary care doctor.  Assessment and plan of treatment:	You were given IV fluid for your acute kidney injury. Your acute kidney injury is now resolved. Please take your medications as prescribed. Please follow up with your primary care doctor for follow up.  Goal:	Stable. Medical management. Follow up with your gastroenterologist in 3-4 weeks.  Assessment and plan of treatment:	EGD shows gastritis. Please take your medications as prescribed. Please follow up with your gastroenterologist in 3-4 weeks. Principal Discharge DX:	GI bleed  Goal:	Stable. Medical management. Follow up with your primary care doctor.  Assessment and plan of treatment:	You were admitted to the hospital for low hemoglobin. You had three unit of blood transfused. On physical exam, you were found to have blood in your rectum in the emergency room. EGD was completed and did not show any bleed. Decision was made not to proceed with colonoscopy. Please take your medications as prescribed. Please follow up with your primary care doctor in 1 week to monitor your hemoglobin.  Secondary Diagnosis:	Afib  Goal:	Stable. Medical management. Follow up with your primary care doctor.  Assessment and plan of treatment:	Decision was made to discontinue your blood thinner medication (Xarelto). Please take your medications as prescribed. Please follow up with your primary care doctor for follow up.  Secondary Diagnosis:	Hypertension, unspecified type  Goal:	Stable. Medical management. Follow up with your primary care doctor.  Assessment and plan of treatment:	Please take your medications as prescribed. Please follow up with your primary care doctor for follow up.  Secondary Diagnosis:	ESTRELLITA (acute kidney injury)  Goal:	Stable. Medical management. Please follow up with your primary care doctor.  Assessment and plan of treatment:	You were given IV fluid for your acute kidney injury. Your acute kidney injury is now resolved. Please take your medications as prescribed. Please follow up with your primary care doctor for follow up.  Secondary Diagnosis:	Gastritis  Goal:	Stable. Medical management. Follow up with your gastroenterologist in 3-4 weeks.  Assessment and plan of treatment:	EGD shows gastritis. Please take your medications as prescribed. Please follow up with your gastroenterologist in 3-4 weeks.

## 2018-11-17 NOTE — PROGRESS NOTE ADULT - SUBJECTIVE AND OBJECTIVE BOX
Patient sleeping at this time and appears comfortable  In chronic atrial fibrillation/off AC because of GI bleeding  EGD: mild gastrites  Scheduled for colonoscopy on Monday  Niece by bedside and discussion made regarding colonoscopy: if patient has colon cancer are we going to operate and if not then why pursue?  Also patient would have to discontinue AC to prevent further GI loss with a risk for stroke.  Family will decide with patient input.

## 2018-11-17 NOTE — DISCHARGE NOTE ADULT - PLAN OF CARE
Stable. Medical management. Follow up with your primary care doctor. You were admitted to the hospital for low hemoglobin. You had three unit of blood transfused. On physical exam, you were found to have blood in your rectum in the emergency room. EGD was completed and did not show any bleed. Decision was made not to proceed with colonoscopy. Please take your medications as prescribed. Please follow up with your primary care doctor in 1 week to monitor your hemoglobin. Decision was made to discontinue your blood thinner medication (Xarelto). Please take your medications as prescribed. Please follow up with your primary care doctor for follow up. Please take your medications as prescribed. Please follow up with your primary care doctor for follow up. Stable. Medical management. Please follow up with your primary care doctor. You were given IV fluid for your acute kidney injury. Your acute kidney injury is now resolved. Please take your medications as prescribed. Please follow up with your primary care doctor for follow up. Stable. Medical management. Follow up with your gastroenterologist in 3-4 weeks. EGD shows gastritis. Please take your medications as prescribed. Please follow up with your gastroenterologist in 3-4 weeks.

## 2018-11-17 NOTE — DIETITIAN INITIAL EVALUATION ADULT. - FACTORS AFF FOOD INTAKE
Per family, the patient consumes >75% of meals.  Per family, the patient c/o constipation for at least three days.

## 2018-11-17 NOTE — PROGRESS NOTE ADULT - REASON FOR ADMISSION
Generalized weakness

## 2018-11-17 NOTE — DISCHARGE NOTE ADULT - CARE PROVIDER_API CALL
Amilcar Gorman), Internal Medicine  [address] [place] [address]  Phone: [phone]  Fax: [phone]    Moses Geller), Gastroenterology; Internal Medicine  [address] [place] [address]  Phone: [phone]  Fax: [phone]

## 2018-11-17 NOTE — DISCHARGE NOTE ADULT - ADDITIONAL INSTRUCTIONS
Please follow up with your primary care doctor in 1-2 weeks.  Please follow up with your gastroenterologist in 3-4 weeks. Please follow up with your primary care doctor in 1-2 weeks.  -As per Dr. Gorman you may resume the lasix home dose that he had stopped previously. Please call your primary doctor's office if you require a refill.   Please follow up with your gastroenterologist in 3-4 weeks.  Please follow up with your cardiologist or primary care doctor with regard to your next recommended echocardiogram.

## 2018-11-17 NOTE — DISCHARGE NOTE ADULT - CARE PROVIDERS DIRECT ADDRESSES
[email or web address] ,[email or web address]

## 2018-11-17 NOTE — DISCHARGE NOTE ADULT - HOSPITAL COURSE
89 yo F with PMHx of CKD, Afib on xarelto , glaucoma partially blind comes to ED for the generalized weakness. Pt states the pain is in the shoulder and and bilateral thigh. The pain is there for months but on admission the hemoglobin was 3.7. She denied any chest pain, palpitations, Nausea, vomiting or dark stools. While speaking with the nephew she is very fine but she started feeling weak. It was gradual and started getting worse. She was seen by Dr Gorman on Saturday and he said to admit to the hospital but patient did not want to be admitted. But she was feeling very weak today. In ED patient's hb was 3.7 adn she is receiving two units of blood. She stable on room air and does not feeling light headed or tachycardic. In ED patient had SAUD and it was guaiac positive stools. (12 Nov 2018 21:15)    Pt was admitted for the primary diagnosis of Anemia. Pt was found to have hgb of 3.7 and three unit of prbc were given. During the course of her stay, pt underwent EGD which showed gastritis but no sign of bleed. Decision was made with PMD  not to restart Xarelto for her A.fib given her age and high risk of bleed. GI recommended colonoscopy but as per family, they do not wish to proceed with the colonoscopy.     Pt's hgb is stable. Pt is now medically stable for discharge. Pt was educated to take her medications as instructed and asked to follow up with her PMD and GI doctor. Pt further informed to return to the ED for worsening of symptoms. 89 yo F with PMHx of CKD, Afib on xarelto , glaucoma partially blind comes to ED for the generalized weakness. Pt states the pain is in the shoulder and and bilateral thigh. The pain is there for months but on admission the hemoglobin was 3.7. She denied any chest pain, palpitations, Nausea, vomiting or dark stools. While speaking with the nephew she is very fine but she started feeling weak. It was gradual and started getting worse. She was seen by Dr Gorman on Saturday and he said to admit to the hospital but patient did not want to be admitted. But she was feeling very weak today. In ED patient's hb was 3.7 adn she is receiving two units of blood. She stable on room air and does not feeling light headed or tachycardic. In ED patient had SAUD and it was guaiac positive stools. (12 Nov 2018 21:15)    Pt was admitted for the primary diagnosis of Anemia. Pt was found to have hgb of 3.7 and three unit of prbc were given. During the course of her stay, pt underwent EGD which showed gastritis but no sign of bleed. Decision was made with PMD  not to restart Xarelto for her A.fib given her age and high risk of bleed. GI recommended colonoscopy but as per family, they do not wish to proceed with the colonoscopy.     Pt's hgb is stable. Pt is now medically stable for discharge. Pt was educated to take her medications as instructed and asked to follow up with her PMD and GI doctor. Pt further informed to return to the ED for worsening of symptoms.   The patient is considered medically stable for discharge to home with a hospital bed and rolling walker. Dr. Gorman has verbally instructed that patient resume home lasix dose and follow up with him in 1 week.     Prior to discharge it has been determined that the patient requires a hospital bed.  This patient requires hospital bed for home with gel overlay.  Patient requires frequent position changes to prevent skin breakdown.  Pillows and wedges have been tried and failed.  Patient requires head of bed to be elevated greater than 30 degrees. Pt is at risk of aspiration.

## 2018-11-17 NOTE — PROGRESS NOTE ADULT - SUBJECTIVE AND OBJECTIVE BOX
LEYDI LEE 89y Female  MRN#: [id number]       SUBJECTIVE  Patient is a 89y old Female who presents with a chief complaint of Generalized weakness (17 Nov 2018 08:12)    she is currently admitted to medicine with the primary diagnosis of Anemia    Today is hospital day 5d, and this morning she is _    No acute overnight events.     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 650 milliGRAM(s) Oral every 6 hours PRN    HOME MEDICATIONS  Home Medications:  dilTIAZem 120 mg/24 hours oral tablet, extended release: 1 tab(s) orally once a day (12 Nov 2018 22:43)  timolol hemihydrate 0.5% ophthalmic solution: 1 drop(s) to each affected eye 2 times a day (12 Nov 2018 22:42)  Xarelto 15 mg oral tablet: 1 tab(s) orally once a day (in the evening) (12 Nov 2018 22:42)      VITAL SIGNS: Last 24 Hours  T(C): 37.1 (17 Nov 2018 06:41), Max: 37.1 (17 Nov 2018 06:41)  T(F): 98.7 (17 Nov 2018 06:41), Max: 98.7 (17 Nov 2018 06:41)  HR: 86 (17 Nov 2018 06:41) (78 - 99)  BP: 122/64 (17 Nov 2018 06:41) (111/56 - 151/87)  BP(mean): --  RR: 18 (17 Nov 2018 06:41) (18 - 20)  SpO2: 93% (17 Nov 2018 08:20) (93% - 98%)    LABS:                        8.9    9.09  )-----------( 250      ( 16 Nov 2018 20:44 )             28.7     11-16    142  |  104  |  35<H>  ----------------------------<  89  5.8<H>   |  27  |  1.4    Ca    8.7      16 Nov 2018 08:18  Mg     2.7     11-16      PT/INR - ( 16 Nov 2018 08:18 )   PT: 15.30 sec;   INR: 1.33 ratio         CAPILLARY BLOOD GLUCOSE          RADIOLOGY:    EGD completed on 11/16  Impressions:  -normal entire esophagus  -mild gastritis was found in stomach. Multiple biopsies taken  -normal duodenal bulb and 2nd portion of the duodenum. 2 bx taken    Recommendations:  -avoid all NSAIDs   -begin protonix 40mg PO twice a day  -follow up on the results of bx specimen   -colonoscopy on Monday  -f/u w/ [facility].      PHYSICAL EXAM:    GENERAL: NAD, elderly, nearly blind  HEENT:  Atraumatic, Normocephalic. EOMI. No JVD  PULMONARY: Clear to auscultation bilaterally; No wheeze  CARDIOVASCULAR: Regular rate and rhythm; No murmurs.  GASTROINTESTINAL: Soft, Nontender, Nondistended  MUSCULOSKELETAL:  2+ Peripheral Pulses, No clubbing, cyanosis, or edema. 1+ pitting edema  NEUROLOGY: non-focal      ADMISSION SUMMARY  Patient is a 89y old Female who presents with a chief complaint of Generalized weakness (17 Nov 2018 08:12)     she currently admitted to medicine with the primary diagnosis of Anemia LEYDI LEE 89y Female  MRN#: [id number]       SUBJECTIVE  Patient is a 89y old Female who presents with a chief complaint of Generalized weakness (17 Nov 2018 08:12)    she is currently admitted to medicine with the primary diagnosis of Anemia    Today is hospital day 5d, and this morning she is on chair and sleeping. Family by bedside.     No acute overnight events.     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 650 milliGRAM(s) Oral every 6 hours PRN    HOME MEDICATIONS  Home Medications:  dilTIAZem 120 mg/24 hours oral tablet, extended release: 1 tab(s) orally once a day (12 Nov 2018 22:43)  timolol hemihydrate 0.5% ophthalmic solution: 1 drop(s) to each affected eye 2 times a day (12 Nov 2018 22:42)  Xarelto 15 mg oral tablet: 1 tab(s) orally once a day (in the evening) (12 Nov 2018 22:42)      VITAL SIGNS: Last 24 Hours  T(C): 37.1 (17 Nov 2018 06:41), Max: 37.1 (17 Nov 2018 06:41)  T(F): 98.7 (17 Nov 2018 06:41), Max: 98.7 (17 Nov 2018 06:41)  HR: 86 (17 Nov 2018 06:41) (78 - 99)  BP: 122/64 (17 Nov 2018 06:41) (111/56 - 151/87)  BP(mean): --  RR: 18 (17 Nov 2018 06:41) (18 - 20)  SpO2: 93% (17 Nov 2018 08:20) (93% - 98%)    LABS:                        8.9    9.09  )-----------( 250      ( 16 Nov 2018 20:44 )             28.7     11-16    142  |  104  |  35<H>  ----------------------------<  89  5.8<H>   |  27  |  1.4    Ca    8.7      16 Nov 2018 08:18  Mg     2.7     11-16      PT/INR - ( 16 Nov 2018 08:18 )   PT: 15.30 sec;   INR: 1.33 ratio         CAPILLARY BLOOD GLUCOSE          RADIOLOGY:    EGD completed on 11/16  Impressions:  -normal entire esophagus  -mild gastritis was found in stomach. Multiple biopsies taken  -normal duodenal bulb and 2nd portion of the duodenum. 2 bx taken    Recommendations:  -avoid all NSAIDs   -begin protonix 40mg PO twice a day  -follow up on the results of bx specimen   -colonoscopy on Monday  -f/u w/ [facility].      PHYSICAL EXAM:    GENERAL: NAD, elderly, nearly blind  HEENT:  Atraumatic, Normocephalic. EOMI. No JVD  PULMONARY: Clear to auscultation bilaterally; No wheeze  CARDIOVASCULAR: Regular rate and rhythm; No murmurs.  GASTROINTESTINAL: Soft, Nontender, Nondistended  MUSCULOSKELETAL:  2+ Peripheral Pulses, No clubbing, cyanosis, or edema. 1+ pitting edema  NEUROLOGY: non-focal      ADMISSION SUMMARY  Patient is a 89y old Female who presents with a chief complaint of Generalized weakness (17 Nov 2018 08:12)     she currently admitted to medicine with the primary diagnosis of Anemia

## 2018-11-18 VITALS
HEART RATE: 96 BPM | RESPIRATION RATE: 18 BRPM | SYSTOLIC BLOOD PRESSURE: 125 MMHG | TEMPERATURE: 97 F | DIASTOLIC BLOOD PRESSURE: 61 MMHG

## 2018-11-18 RX ORDER — FUROSEMIDE 40 MG
1 TABLET ORAL
Qty: 0 | Refills: 0 | COMMUNITY

## 2018-11-18 RX ORDER — FUROSEMIDE 40 MG
1 TABLET ORAL
Qty: 30 | Refills: 0 | OUTPATIENT
Start: 2018-11-18 | End: 2018-12-17

## 2018-11-18 RX ADMIN — Medication 120 MILLIGRAM(S): at 05:57

## 2018-11-18 RX ADMIN — HEPARIN SODIUM 5000 UNIT(S): 5000 INJECTION INTRAVENOUS; SUBCUTANEOUS at 06:01

## 2018-11-18 RX ADMIN — CHLORHEXIDINE GLUCONATE 1 APPLICATION(S): 213 SOLUTION TOPICAL at 06:01

## 2018-11-18 RX ADMIN — PANTOPRAZOLE SODIUM 40 MILLIGRAM(S): 20 TABLET, DELAYED RELEASE ORAL at 05:58

## 2018-11-18 NOTE — PHYSICAL THERAPY INITIAL EVALUATION ADULT - PLANNED THERAPY INTERVENTIONS, PT EVAL
balance training/bed mobility training/stretching/gait training/ROM/strengthening/neuromuscular re-education/transfer training

## 2018-11-19 LAB — SURGICAL PATHOLOGY STUDY: SIGNIFICANT CHANGE UP

## 2018-11-29 ENCOUNTER — OUTPATIENT (OUTPATIENT)
Dept: OUTPATIENT SERVICES | Facility: HOSPITAL | Age: 83
LOS: 1 days | Discharge: HOME | End: 2018-11-29

## 2018-11-29 DIAGNOSIS — H57.8 OTHER SPECIFIED DISORDERS OF EYE AND ADNEXA: Chronic | ICD-10-CM

## 2018-11-29 DIAGNOSIS — N18.9 CHRONIC KIDNEY DISEASE, UNSPECIFIED: ICD-10-CM

## 2018-11-29 DIAGNOSIS — I10 ESSENTIAL (PRIMARY) HYPERTENSION: ICD-10-CM

## 2018-11-29 PROBLEM — Z41.9 ENCOUNTER FOR PROCEDURE FOR PURPOSES OTHER THAN REMEDYING HEALTH STATE, UNSPECIFIED: Chronic | Status: ACTIVE | Noted: 2018-11-12

## 2018-11-29 PROBLEM — I50.9 HEART FAILURE, UNSPECIFIED: Chronic | Status: ACTIVE | Noted: 2018-11-12

## 2018-11-29 PROBLEM — H40.2223 CHRONIC ANGLE-CLOSURE GLAUCOMA, LEFT EYE, SEVERE STAGE: Chronic | Status: ACTIVE | Noted: 2018-11-12

## 2018-11-29 PROBLEM — N18.4 CHRONIC KIDNEY DISEASE, STAGE 4 (SEVERE): Chronic | Status: ACTIVE | Noted: 2018-11-12

## 2018-11-30 DIAGNOSIS — M16.0 BILATERAL PRIMARY OSTEOARTHRITIS OF HIP: ICD-10-CM

## 2018-11-30 DIAGNOSIS — E66.3 OVERWEIGHT: ICD-10-CM

## 2018-11-30 DIAGNOSIS — K29.71 GASTRITIS, UNSPECIFIED, WITH BLEEDING: ICD-10-CM

## 2018-11-30 DIAGNOSIS — N18.4 CHRONIC KIDNEY DISEASE, STAGE 4 (SEVERE): ICD-10-CM

## 2018-11-30 DIAGNOSIS — N17.9 ACUTE KIDNEY FAILURE, UNSPECIFIED: ICD-10-CM

## 2018-11-30 DIAGNOSIS — D62 ACUTE POSTHEMORRHAGIC ANEMIA: ICD-10-CM

## 2018-11-30 DIAGNOSIS — R53.1 WEAKNESS: ICD-10-CM

## 2018-11-30 DIAGNOSIS — M19.011 PRIMARY OSTEOARTHRITIS, RIGHT SHOULDER: ICD-10-CM

## 2018-11-30 DIAGNOSIS — H40.2223 CHRONIC ANGLE-CLOSURE GLAUCOMA, LEFT EYE, SEVERE STAGE: ICD-10-CM

## 2018-11-30 DIAGNOSIS — H54.42A3 BLINDNESS LEFT EYE CATEGORY 3, NORMAL VISION RIGHT EYE: ICD-10-CM

## 2018-11-30 DIAGNOSIS — I13.0 HYPERTENSIVE HEART AND CHRONIC KIDNEY DISEASE WITH HEART FAILURE AND STAGE 1 THROUGH STAGE 4 CHRONIC KIDNEY DISEASE, OR UNSPECIFIED CHRONIC KIDNEY DISEASE: ICD-10-CM

## 2018-11-30 DIAGNOSIS — I50.9 HEART FAILURE, UNSPECIFIED: ICD-10-CM

## 2018-11-30 DIAGNOSIS — Z79.01 LONG TERM (CURRENT) USE OF ANTICOAGULANTS: ICD-10-CM

## 2018-11-30 DIAGNOSIS — I48.91 UNSPECIFIED ATRIAL FIBRILLATION: ICD-10-CM

## 2019-01-05 ENCOUNTER — OUTPATIENT (OUTPATIENT)
Dept: OUTPATIENT SERVICES | Facility: HOSPITAL | Age: 84
LOS: 1 days | Discharge: HOME | End: 2019-01-05

## 2019-01-05 DIAGNOSIS — H57.8 OTHER SPECIFIED DISORDERS OF EYE AND ADNEXA: Chronic | ICD-10-CM

## 2019-01-09 DIAGNOSIS — N18.9 CHRONIC KIDNEY DISEASE, UNSPECIFIED: ICD-10-CM

## 2019-03-11 ENCOUNTER — OUTPATIENT (OUTPATIENT)
Dept: OUTPATIENT SERVICES | Facility: HOSPITAL | Age: 84
LOS: 1 days | Discharge: HOME | End: 2019-03-11

## 2019-03-11 DIAGNOSIS — H57.8 OTHER SPECIFIED DISORDERS OF EYE AND ADNEXA: Chronic | ICD-10-CM

## 2019-03-11 DIAGNOSIS — N18.9 CHRONIC KIDNEY DISEASE, UNSPECIFIED: ICD-10-CM

## 2019-05-14 ENCOUNTER — OUTPATIENT (OUTPATIENT)
Dept: OUTPATIENT SERVICES | Facility: HOSPITAL | Age: 84
LOS: 1 days | Discharge: HOME | End: 2019-05-14

## 2019-05-14 DIAGNOSIS — H57.8 OTHER SPECIFIED DISORDERS OF EYE AND ADNEXA: Chronic | ICD-10-CM

## 2019-05-16 DIAGNOSIS — N18.9 CHRONIC KIDNEY DISEASE, UNSPECIFIED: ICD-10-CM

## 2019-10-24 ENCOUNTER — OUTPATIENT (OUTPATIENT)
Dept: OUTPATIENT SERVICES | Facility: HOSPITAL | Age: 84
LOS: 1 days | Discharge: HOME | End: 2019-10-24

## 2019-10-24 DIAGNOSIS — H57.8 OTHER SPECIFIED DISORDERS OF EYE AND ADNEXA: Chronic | ICD-10-CM

## 2019-10-24 DIAGNOSIS — N18.4 CHRONIC KIDNEY DISEASE, STAGE 4 (SEVERE): ICD-10-CM

## 2019-10-24 DIAGNOSIS — I50.32 CHRONIC DIASTOLIC (CONGESTIVE) HEART FAILURE: ICD-10-CM

## 2020-12-21 NOTE — ED PROVIDER NOTE - NS ED ROS FT
Pending Prescriptions:                       Disp   Refills    HYDROcodone-acetaminophen (NORCO) 5-325 MG*4 tabl*0        Sig: Take 1 tablet by mouth every 6 hours as needed for           pain      Routing refill request to provider for review/approval because:  Drug not on the [facility] refill protocol     Radha Mejia RN           Eyes:  No visual changes, eye pain or discharge.  ENMT:  No hearing changes, pain, discharge or infections. No neck pain or stiffness.  Cardiac:  No chest pain, SOB or edema.  Respiratory:  No cough or respiratory distress.   GI:  No nausea, vomiting, diarrhea or abdominal pain.  :  No dysuria, frequency or burning.  MS: R shoulder pain, bilateral diffuse thigh pain.   Neuro:  No headache or weakness.  No LOC.  Skin:  No skin rash.   Endocrine: No history of thyroid disease or diabetes.

## 2021-05-17 NOTE — ED PROVIDER NOTE - SEVERITY
MODERATE Double O-Z Plasty Text: The defect edges were debeveled with a #15 scalpel blade.  Given the location of the defect, shape of the defect and the proximity to free margins a Double O-Z plasty (double transposition flap) was deemed most appropriate.  Using a sterile surgical marker, the appropriate transposition flaps were drawn incorporating the defect and placing the expected incisions within the relaxed skin tension lines where possible. The area thus outlined was incised deep to adipose tissue with a #15 scalpel blade.  The skin margins were undermined to an appropriate distance in all directions utilizing iris scissors.  Hemostasis was achieved with electrocautery.  The flaps were then transposed into place, one clockwise and the other counterclockwise, and anchored with interrupted buried subcutaneous sutures.

## 2021-07-06 NOTE — ED ADULT NURSE REASSESSMENT NOTE - NS ED NURSE REASSESS COMMENT FT1
Please make sure to keep the extremity elevated at the level of the heart.  Ice it to try to help reduce the swelling as well.  
pt. report received. Pt. assessed. pt. resting comfortably in bed. VSS. No acute distress noted. pt. alert and oriented times four. Family at bedside. Comfort and safety measures provided. will continue to monitor.
Patient received 2 units PRBCs. No transfusion reaction noted. L forearm #20 IV patent with no s/s of infiltration or infection. Patient assisted with toileting, cleaned, and position for comfort. No c/o pain. VSS and recorded. Will continue to monitor.

## 2023-10-19 NOTE — PRE-ANESTHESIA EVALUATION ADULT - WEIGHT IN KG
65.4 Topical Clindamycin Pregnancy And Lactation Text: This medication is Pregnancy Category B and is considered safe during pregnancy. It is unknown if it is excreted in breast milk.